# Patient Record
Sex: FEMALE | Race: WHITE | HISPANIC OR LATINO | Employment: UNEMPLOYED | URBAN - METROPOLITAN AREA
[De-identification: names, ages, dates, MRNs, and addresses within clinical notes are randomized per-mention and may not be internally consistent; named-entity substitution may affect disease eponyms.]

---

## 2017-01-14 ENCOUNTER — HOSPITAL ENCOUNTER (EMERGENCY)
Facility: HOSPITAL | Age: 42
Discharge: HOME | End: 2017-01-14
Attending: EMERGENCY MEDICINE

## 2017-01-14 VITALS
BODY MASS INDEX: 24.1 KG/M2 | OXYGEN SATURATION: 100 % | DIASTOLIC BLOOD PRESSURE: 65 MMHG | TEMPERATURE: 99 F | HEIGHT: 63 IN | SYSTOLIC BLOOD PRESSURE: 136 MMHG | HEART RATE: 80 BPM | RESPIRATION RATE: 18 BRPM | WEIGHT: 136 LBS

## 2017-01-14 DIAGNOSIS — M54.42 LEFT-SIDED LOW BACK PAIN WITH LEFT-SIDED SCIATICA, UNSPECIFIED CHRONICITY: Primary | ICD-10-CM

## 2017-01-14 DIAGNOSIS — M54.32 LEFT SIDED SCIATICA: ICD-10-CM

## 2017-01-14 LAB
BILIRUB UR QL STRIP: NEGATIVE
CLARITY UR: CLEAR
COLOR UR: YELLOW
GLUCOSE UR QL STRIP: NEGATIVE
HGB UR QL STRIP: NEGATIVE
KETONES UR QL STRIP: NEGATIVE
LEUKOCYTE ESTERASE UR QL STRIP: NEGATIVE
NITRITE UR QL STRIP: NEGATIVE
PH UR STRIP: 6 [PH] (ref 5–8)
PROT UR QL STRIP: NEGATIVE
SP GR UR STRIP: 1.02 (ref 1–1.03)
URN SPEC COLLECT METH UR: NORMAL
UROBILINOGEN UR STRIP-ACNC: NEGATIVE EU/DL

## 2017-01-14 PROCEDURE — 99284 EMERGENCY DEPT VISIT MOD MDM: CPT | Mod: 25

## 2017-01-14 PROCEDURE — 63600175 PHARM REV CODE 636 W HCPCS: Performed by: EMERGENCY MEDICINE

## 2017-01-14 PROCEDURE — 96372 THER/PROPH/DIAG INJ SC/IM: CPT

## 2017-01-14 PROCEDURE — 81003 URINALYSIS AUTO W/O SCOPE: CPT

## 2017-01-14 RX ORDER — KETOROLAC TROMETHAMINE 30 MG/ML
30 INJECTION, SOLUTION INTRAMUSCULAR; INTRAVENOUS
Status: COMPLETED | OUTPATIENT
Start: 2017-01-14 | End: 2017-01-14

## 2017-01-14 RX ORDER — DICLOFENAC POTASSIUM 50 MG/1
50 TABLET, FILM COATED ORAL 3 TIMES DAILY
Qty: 21 TABLET | Refills: 0 | Status: SHIPPED | OUTPATIENT
Start: 2017-01-14 | End: 2020-10-01

## 2017-01-14 RX ORDER — CYCLOBENZAPRINE HCL 10 MG
10 TABLET ORAL 3 TIMES DAILY PRN
Qty: 15 TABLET | Refills: 0 | Status: SHIPPED | OUTPATIENT
Start: 2017-01-14 | End: 2017-01-19

## 2017-01-14 RX ORDER — IBUPROFEN 800 MG/1
800 TABLET ORAL 3 TIMES DAILY
COMMUNITY
End: 2017-01-14 | Stop reason: ALTCHOICE

## 2017-01-14 RX ORDER — ALBUTEROL SULFATE 0.63 MG/3ML
0.63 SOLUTION RESPIRATORY (INHALATION) EVERY 6 HOURS PRN
COMMUNITY
End: 2020-10-01

## 2017-01-14 RX ADMIN — KETOROLAC TROMETHAMINE 30 MG: 30 INJECTION, SOLUTION INTRAMUSCULAR at 06:01

## 2017-01-14 NOTE — ED AVS SNAPSHOT
[facility]  [address]  [place] [address]               Tennille Fortune   2017  5:56 PM   ED    Description:  Female : 1975   Department:  [facility]           Your Care was Coordinated By:     Provider Role From To    Rigo Boyd Jr., MD Attending Provider [phone] --      Reason for Visit     Back Pain           Diagnoses this Visit        Comments    Left-sided low back pain with left-sided sciatica, unspecified chronicity    -  Primary     Left sided sciatica           ED Disposition     None           To Do List           Follow-up Information     Follow up with Joy Matias MD.    Specialty:  Family Medicine    Why:  This next week if not improved    Contact information:    [address]  [address]  [place] [address]  [phone]         These Medications        Disp Refills Start End    diclofenac (CATAFLAM) 50 MG tablet 21 tablet 0 2017     Take 1 tablet (50 mg total) by mouth 3 (three) times daily. Take with meals t.i.d. - Oral    Pharmacy: [facility] [address] [place] [address] [address] [facility] AT [facility] Ph #: [phone]       cyclobenzaprine (FLEXERIL) 10 MG tablet 15 tablet 0 2017    Take 1 tablet (10 mg total) by mouth 3 (three) times daily as needed for Muscle spasms. - Oral    [facility] [address] [place] [address] [address] [facility] Ph #: [phone]         [facility]     [facility] -  Assistance  Registered nurses in the [facility] provide clinical advisement, health education, appointment booking, and other advisory services.  Call for this free service at [phone].             Medications           Message regarding Medications     Verify the changes and/or additions to your medication regime listed below are the same as discussed with your clinician  "today.  If any of these changes or additions are incorrect, please notify your healthcare provider.        START taking these NEW medications        Refills    diclofenac (CATAFLAM) 50 MG tablet 0    Sig: Take 1 tablet (50 mg total) by mouth 3 (three) times daily. Take with meals t.i.d.    Class: Print    Route: Oral    cyclobenzaprine (FLEXERIL) 10 MG tablet 0    Sig: Take 1 tablet (10 mg total) by mouth 3 (three) times daily as needed for Muscle spasms.    Class: Print    Route: Oral      These medications were administered today        Dose Freq    ketorolac injection 30 mg 30 mg ED 1 Time    Sig: Inject 30 mg into the muscle ED 1 Time.    Class: Normal    Route: Intramuscular      STOP taking these medications     omeprazole (PRILOSEC) 40 MG capsule Take 1 capsule (40 mg total) by mouth once daily.    cetirizine (ZYRTEC) 10 MG tablet Take 1 tablet (10 mg total) by mouth once daily. For sinusitis and drainage.    fluoxetine (PROZAC) 10 MG capsule Take 1 capsule (10 mg total) by mouth once daily. For depression    ibuprofen (ADVIL,MOTRIN) 800 MG tablet Take 800 mg by mouth 3 (three) times daily.           Verify that the below list of medications is an accurate representation of the medications you are currently taking.  If none reported, the list may be blank. If incorrect, please contact your healthcare provider. Carry this list with you in case of emergency.           Current Medications     albuterol (ACCUNEB) 0.63 mg/3 mL Nebu Take 0.63 mg by nebulization every 6 (six) hours as needed.    cyclobenzaprine (FLEXERIL) 10 MG tablet Take 1 tablet (10 mg total) by mouth 3 (three) times daily as needed for Muscle spasms.    diclofenac (CATAFLAM) 50 MG tablet Take 1 tablet (50 mg total) by mouth 3 (three) times daily. Take with meals t.i.d.           Clinical Reference Information           Your Vitals Were     BP Pulse Temp Resp Height Weight    117/73 97 98.6 °F (37 °C) (Oral) 18 5' 3" (1.6 m) 61.7 kg (136 lb)    " SpO2 BMI             99% 24.09 kg/m2         Allergies as of 1/14/2017     No Known Allergies      Immunizations Administered on Date of Encounter - 1/14/2017     None      ED Micro, Lab, POCT     Start Ordered       Status Ordering Provider    01/14/17 1747 01/14/17 1746  Urinalysis Clean Catch  STAT      Final result       ED Imaging Orders     Start Ordered       Status Ordering Provider    01/14/17 1849 01/14/17 1849  X-Ray Lumbar Spine Ap And Lateral  1 time imaging      Final result     01/14/17 1849 01/14/17 1849  X-Ray Sacrum And Coccyx  1 time imaging      Final result         Discharge Instructions         Back Pain (Acute or Chronic)    Back pain is one of the most common problems. The good news is that most people feel better in 1 to 2 weeks, and most of the rest in 1 to 2 months. Most people can remain active.  People experience and describe pain differently; not everyone is the same.  · The pain can be sharp, stabbing, shooting, aching, cramping or burning.  · Movement, standing, bending, lifting, sitting, or walking may worsen pain.  · It can be localized to one spot or area, or it can be more generalized.  · It can spread or radiate upwards, to the front, or go down your arms or legs (sciatica).  · It can cause muscle spasm.  Most of the time, mechanical problems with the muscles or spine cause the pain. Mechanical problems are usually caused by an injury to the muscles or ligaments. While illness can cause back pain, it is usually not caused by a serious illness. Mechanical problems include:   · Physical activity such as sports, exercise, work, or normal activity  · Overexertion, lifting, pushing, pulling incorrectly or too aggressively  · Sudden twisting, bending, or stretching from an accident, or accidental movement  · Poor posture  · Stretching or moving wrong, without noticing pain at the time  · Poor coordination, lack of regular exercise (check with your doctor about this)  · Spinal disc  disease or arthritis  · Stress  Pain can also be related to pregnancy, or illness like appendicitis, bladder or kidney infections, pelvic infections, and many other things.  Acute back pain usually gets better in 1 to 2 weeks. Back pain related to disk disease, arthritis in the spinal joints or spinal stenosis (narrowing of the spinal canal) can become chronic and last for months or years.  Unless you had a physical injury (for example, a car accident or fall) X-rays are usually not needed for the initial evaluation of back pain. If pain continues and does not respond to medical treatment, X-rays and other tests may be needed.  Home care  Try these home care recommendations:  · When in bed, try to find a position of comfort. A firm mattress is best. Try lying flat on your back with pillows under your knees. You can also try lying on your side with your knees bent up towards your chest and a pillow between your knees.  · At first, do not try to stretch out the sore spots. If there is a strain, it is not like the good soreness you get after exercising without an injury. In this case, stretching may make it worse.  · Avoid prolong sitting, long car rides, or travel. This puts more stress on the lower back than standing or walking.  · During the first 24 to 72 hours after an acute injury or flare up of chronic back pain, apply an ice pack to the painful area for 20 minutes and then remove it for 20 minutes. Do this over a period of 60 to 90 minutes or several times a day. This will reduce swelling and pain. Wrap the ice pack in a thin towel or plastic to protect your skin.  · You can start with ice, then switch to heat. Heat (hot shower, hot bath, or heating pad) reduces pain and works well for muscle spasms. Heat can be applied to the painful area for 20 minutes then remove it for 20 minutes. Do this over a period of 60 to 90 minutes or several times a day. Do not sleep on a heating pad. It can lead to skin burns or  tissue damage.  · You can alternate ice and heat therapy. Talk with your doctor about the best treatment for your back pain.  · Therapeutic massage can help relax the back muscles without stretching them.  · Be aware of safe lifting methods and do not lift anything without stretching first.  Medicines  Talk to your doctor before using medicine, especially if you have other medical problems or are taking other medicines.  · You may use over-the-counter medicine as directed on the bottle to control pain, unless another pain medicine was prescribed. If you have chronic conditions like diabetes, liver or kidney disease, stomach ulcers, or gastrointestinal bleeding, or are taking blood thinners, talk to your doctor before taking any medicine.  · Be careful if you are given a prescription medicines, narcotics, or medicine for muscle spasms. They can cause drowsiness, affect your coordination, reflexes, and judgement. Do not drive or operate heavy machinery.  Follow-up care  Follow up with your healthcare provider, or as advised.   A radiologist will review any X-rays that were taken. Your provide will notify you of any new findings that may affect your care.  Call 911  Call emergency services if any of the following occur:  · Trouble breathing  · Confusion  · Very drowsy or trouble awakening  · Fainting or loss of consciousness  · Rapid or very slow heart rate  · Loss of bowel or bladder control  When to seek medical advice  Call your healthcare provider right away if any of these occur:   · Pain becomes worse or spreads to your legs  · Weakness or numbness in one or both legs  · Numbness in the groin or genital area  © 4667-4093 [employer]. [address] [place] [address], [place] [address]. All rights reserved. This information is not intended as a substitute for professional medical care. Always follow your healthcare professional's instructions.          Sciatica    Sciatica is a condition that causes pain in  the lower back and down into the buttock, hip, and leg. Sometimes the leg pain can happen without any back pain. Sciatica happens when a spinal nerve is irritated or has pressure put on it as comes out of the spinal canal in the lower back. This most often happens when a bulge or rupture of a nearby spinal disk presses on the nerve. Sciatica can also be caused by a narrowing of the spinal canal (spinal stenosis) or spasm of the muscle in the buttocks that the sciatic nerve passes through (pyriform muscle). Sciatica is also called lumbar radiculopathy.  Sciatica may begin after a sudden twisting or bending force, such as in a car accident. Or it can happen after a simple awkward movement. In either case, muscle spasm often also happens. Muscle spasm makes the pain worse.  A health care provider makes a diagnosis of sciatica from your symptoms and a physical exam. Unless you had an injury from a car accident or fall, you usually wont have X-rays taken at this time. This is because the nerves and disks in your back cant be seen on an X-ray. If the provider sees signs of a compressed nerve, you will need to schedule an MRI scan as an outpatient. Signs of a compressed nerve include loss of strength in a leg.  Most sciatica gets better with medicine, exercise, and physical therapy. If your symptoms continue after at least 3 months of medical treatment, you may need surgery.  Home care  Follow these tips when caring for yourself at home:  · You may need to stay in bed the first few days. But as soon as possible, begin sitting or walking. This will help you avoid problems that come from staying in bed for long periods.  · When in bed, try to find a position that is comfortable. A firm mattress is best. Try lying flat on your back with pillows under your knees. You can also try lying on your side with your knees bent up toward your chest and a pillow between your knees.  · Avoid sitting for long periods. This puts more  stress on your lower back than standing or walking.  · Use heat from a hot shower, hot bath, or heating pad to help ease pain. Massage can also help. You can also try using an ice pack. You can make your own ice pack by putting ice cubes in a plastic bag. Wrap the bag in a thin towel. Try both heat and cold to see which works best. Use the method that feels best for 20 minutes several times a day.  · You may use acetaminophen or ibuprofen to ease pain, unless another pain medicine was prescribed. Note: If you have chronic liver or kidney disease, talk with your health care provider before taking these medicines. Also talk with your provider if youve had a stomach ulcer or GI bleeding.  · Use safe lifting methods. Dont lift anything heavier than 15 pounds until all of the pain is gone.  Follow-up care  Follow up with your health care provider if your symptoms dont start to get better after 1 week. You may need physical therapy or additional tests.  If X-rays were taken, they will be looked at by a radiologist. You will be told of any new findings that may affect your care.  When to seek medical advice  Call your health care provider right away if any of these occur:  · Pain gets worse even after taking prescribed medicine  · Weakness or numbness in 1 or both legs or hips  · Numbness in your groin or genital area  · You cant control your bowel or bladder  · Fever  · Redness or swelling over your back or spine   © 1479-2462 [employer]. [address] [place] [address]. All rights reserved. This information is not intended as a substitute for professional medical care. Always follow your healthcare professional's instructions.          [facility] Sign-Up     Activating your [facility] account is as easy as 1-2-3!     1) Visit [email or web address], select Sign Up Now, enter this activation code and your date of birth, then select Next.  [email or web address]  Expires: 2/28/2017  7:47 PM      2) Create a  username and password to use when you visit [facility] in the future and select a security question in case you lose your password and select Next.    3) Enter your e-mail address and click Sign Up!    Additional Information  If you have questions, please e-mail [email or web address] or call [phone] to talk to our [employer] staff. Remember, [facility] is NOT to be used for urgent needs. For medical emergencies, dial 911.          [facility] complies with applicable Federal civil rights laws and does not discriminate on the basis of race, color, national origin, age, disability, or sex.        Language Assistance Services     ATTENTION: Language assistance services are available, free of charge. Please call [phone].      ATENCIÓN: Si habla español, tiene a duenas disposición servicios gratuitos de asistencia lingüística. Llame al [phone].     CHÚ Ý: N?u b?n nói Ti?ng Vi?t, có các d?ch v? h? tr? ngôn ng? mi?n phí dành cho b?n. G?i s? [phone].

## 2017-01-15 NOTE — DISCHARGE INSTRUCTIONS
Back Pain (Acute or Chronic)    Back pain is one of the most common problems. The good news is that most people feel better in 1 to 2 weeks, and most of the rest in 1 to 2 months. Most people can remain active.  People experience and describe pain differently; not everyone is the same.  · The pain can be sharp, stabbing, shooting, aching, cramping or burning.  · Movement, standing, bending, lifting, sitting, or walking may worsen pain.  · It can be localized to one spot or area, or it can be more generalized.  · It can spread or radiate upwards, to the front, or go down your arms or legs (sciatica).  · It can cause muscle spasm.  Most of the time, mechanical problems with the muscles or spine cause the pain. Mechanical problems are usually caused by an injury to the muscles or ligaments. While illness can cause back pain, it is usually not caused by a serious illness. Mechanical problems include:   · Physical activity such as sports, exercise, work, or normal activity  · Overexertion, lifting, pushing, pulling incorrectly or too aggressively  · Sudden twisting, bending, or stretching from an accident, or accidental movement  · Poor posture  · Stretching or moving wrong, without noticing pain at the time  · Poor coordination, lack of regular exercise (check with your doctor about this)  · Spinal disc disease or arthritis  · Stress  Pain can also be related to pregnancy, or illness like appendicitis, bladder or kidney infections, pelvic infections, and many other things.  Acute back pain usually gets better in 1 to 2 weeks. Back pain related to disk disease, arthritis in the spinal joints or spinal stenosis (narrowing of the spinal canal) can become chronic and last for months or years.  Unless you had a physical injury (for example, a car accident or fall) X-rays are usually not needed for the initial evaluation of back pain. If pain continues and does not respond to medical treatment, X-rays and other tests may be  needed.  Home care  Try these home care recommendations:  · When in bed, try to find a position of comfort. A firm mattress is best. Try lying flat on your back with pillows under your knees. You can also try lying on your side with your knees bent up towards your chest and a pillow between your knees.  · At first, do not try to stretch out the sore spots. If there is a strain, it is not like the good soreness you get after exercising without an injury. In this case, stretching may make it worse.  · Avoid prolong sitting, long car rides, or travel. This puts more stress on the lower back than standing or walking.  · During the first 24 to 72 hours after an acute injury or flare up of chronic back pain, apply an ice pack to the painful area for 20 minutes and then remove it for 20 minutes. Do this over a period of 60 to 90 minutes or several times a day. This will reduce swelling and pain. Wrap the ice pack in a thin towel or plastic to protect your skin.  · You can start with ice, then switch to heat. Heat (hot shower, hot bath, or heating pad) reduces pain and works well for muscle spasms. Heat can be applied to the painful area for 20 minutes then remove it for 20 minutes. Do this over a period of 60 to 90 minutes or several times a day. Do not sleep on a heating pad. It can lead to skin burns or tissue damage.  · You can alternate ice and heat therapy. Talk with your doctor about the best treatment for your back pain.  · Therapeutic massage can help relax the back muscles without stretching them.  · Be aware of safe lifting methods and do not lift anything without stretching first.  Medicines  Talk to your doctor before using medicine, especially if you have other medical problems or are taking other medicines.  · You may use over-the-counter medicine as directed on the bottle to control pain, unless another pain medicine was prescribed. If you have chronic conditions like diabetes, liver or kidney disease,  stomach ulcers, or gastrointestinal bleeding, or are taking blood thinners, talk to your doctor before taking any medicine.  · Be careful if you are given a prescription medicines, narcotics, or medicine for muscle spasms. They can cause drowsiness, affect your coordination, reflexes, and judgement. Do not drive or operate heavy machinery.  Follow-up care  Follow up with your healthcare provider, or as advised.   A radiologist will review any X-rays that were taken. Your provide will notify you of any new findings that may affect your care.  Call 911  Call emergency services if any of the following occur:  · Trouble breathing  · Confusion  · Very drowsy or trouble awakening  · Fainting or loss of consciousness  · Rapid or very slow heart rate  · Loss of bowel or bladder control  When to seek medical advice  Call your healthcare provider right away if any of these occur:   · Pain becomes worse or spreads to your legs  · Weakness or numbness in one or both legs  · Numbness in the groin or genital area  © 4182-5148 [employer]. [address] [place] [address]. All rights reserved. This information is not intended as a substitute for professional medical care. Always follow your healthcare professional's instructions.          Sciatica    Sciatica is a condition that causes pain in the lower back and down into the buttock, hip, and leg. Sometimes the leg pain can happen without any back pain. Sciatica happens when a spinal nerve is irritated or has pressure put on it as comes out of the spinal canal in the lower back. This most often happens when a bulge or rupture of a nearby spinal disk presses on the nerve. Sciatica can also be caused by a narrowing of the spinal canal (spinal stenosis) or spasm of the muscle in the buttocks that the sciatic nerve passes through (pyriform muscle). Sciatica is also called lumbar radiculopathy.  Sciatica may begin after a sudden twisting or bending force, such  as in a car accident. Or it can happen after a simple awkward movement. In either case, muscle spasm often also happens. Muscle spasm makes the pain worse.  A health care provider makes a diagnosis of sciatica from your symptoms and a physical exam. Unless you had an injury from a car accident or fall, you usually wont have X-rays taken at this time. This is because the nerves and disks in your back cant be seen on an X-ray. If the provider sees signs of a compressed nerve, you will need to schedule an MRI scan as an outpatient. Signs of a compressed nerve include loss of strength in a leg.  Most sciatica gets better with medicine, exercise, and physical therapy. If your symptoms continue after at least 3 months of medical treatment, you may need surgery.  Home care  Follow these tips when caring for yourself at home:  · You may need to stay in bed the first few days. But as soon as possible, begin sitting or walking. This will help you avoid problems that come from staying in bed for long periods.  · When in bed, try to find a position that is comfortable. A firm mattress is best. Try lying flat on your back with pillows under your knees. You can also try lying on your side with your knees bent up toward your chest and a pillow between your knees.  · Avoid sitting for long periods. This puts more stress on your lower back than standing or walking.  · Use heat from a hot shower, hot bath, or heating pad to help ease pain. Massage can also help. You can also try using an ice pack. You can make your own ice pack by putting ice cubes in a plastic bag. Wrap the bag in a thin towel. Try both heat and cold to see which works best. Use the method that feels best for 20 minutes several times a day.  · You may use acetaminophen or ibuprofen to ease pain, unless another pain medicine was prescribed. Note: If you have chronic liver or kidney disease, talk with your health care provider before taking these medicines. Also  talk with your provider if youve had a stomach ulcer or GI bleeding.  · Use safe lifting methods. Dont lift anything heavier than 15 pounds until all of the pain is gone.  Follow-up care  Follow up with your health care provider if your symptoms dont start to get better after 1 week. You may need physical therapy or additional tests.  If X-rays were taken, they will be looked at by a radiologist. You will be told of any new findings that may affect your care.  When to seek medical advice  Call your health care provider right away if any of these occur:  · Pain gets worse even after taking prescribed medicine  · Weakness or numbness in 1 or both legs or hips  · Numbness in your groin or genital area  · You cant control your bowel or bladder  · Fever  · Redness or swelling over your back or spine   © 9576-7358 [employer]. [address] [place] [address], [place] [address]. All rights reserved. This information is not intended as a substitute for professional medical care. Always follow your healthcare professional's instructions.

## 2017-01-15 NOTE — ED PROVIDER NOTES
Encounter Date: 2017       History     Chief Complaint   Patient presents with    Back Pain     c/o low back pain with radiation down ant lt leg denies trauma, pain for 1 week, denies dysuria. saw pcp given ibuprophen and sent for u/s but could not pay for it.     Review of patient's allergies indicates:  No Known Allergies  HPI Comments: Tennille Fortune, a 41 y.o. female, complains of lower back pain with left-sided sciatic radiation for the past 2 weeks.  She denies any injury.  She denies any dysuria.  Complains of intermittent constipation.  She was prescribed ibuprofen 800 mg 3 times a day but said it didn't seem to help so she hasn't taken it in several days.  Pain location: Left lower back pain with sciatica  Pain Severity: Moderate    Pain timin weeks  Pain character: Sharp  Associated with or Modified by: (see above)        Past Medical History   Diagnosis Date    GERD (gastroesophageal reflux disease)     History of abdominal pain     Urolithiasis      No past medical history pertinent negatives.  Past Surgical History   Procedure Laterality Date    Hysterectomy      Endometriosis       Family History   Problem Relation Age of Onset    Asthma Mother     Hypertension Father     Asthma Daughter     Asthma Son     Hypertension Maternal Aunt     Stroke Maternal Grandfather      Social History   Substance Use Topics    Smoking status: Never Smoker    Smokeless tobacco: Never Used    Alcohol use No     Review of Systems   Constitutional: Negative.    Genitourinary: Negative for dysuria and flank pain.   Musculoskeletal: Positive for back pain.   All other systems reviewed and are negative.      Physical Exam   Initial Vitals   BP Pulse Resp Temp SpO2   17 1747 17 1747 17 1747 17 1747 17 1747   117/73 97 18 98.6 °F (37 °C) 99 %     Physical Exam    Nursing note and vitals reviewed.  Constitutional: She appears well-developed and well-nourished. No distress.    Abdominal: Soft. There is no tenderness.   Musculoskeletal: Normal range of motion.   Slight decreased truncal range of motion with left lower back pain.  Mild paraspinous muscle tenderness in the lower left lumbar region.  Good straight leg raises bilaterally to greater than 60° without limitation or pain.  Full range of motion of all joints of both lower extremities without limitation.  No CVA tenderness.   Neurological: She is alert and oriented to person, place, and time. She has normal strength and normal reflexes.   Skin: Skin is warm and dry. No rash noted.   Psychiatric: She has a normal mood and affect. Her behavior is normal. Thought content normal.         ED Course   Procedures  Labs Reviewed   URINALYSIS             Medical Decision Making:   Initial Assessment:   41 y.o. female, complains of lower back pain with left-sided sciatic radiation for the past 2 weeks.  She denies any injury.  She denies any dysuria.  Complains of intermittent constipation.  She was prescribed ibuprofen 800 mg 3 times a day but said it didn't seem to help so she hasn't taken it in several days.  Pain location: Left lower back pain with sciatica  Pain Severity: Moderate    Physical exam: Left sided paraspinous lower lumbar muscle spasm.  Neurologic exam is normal.  ED Management:  Trace lumbar spine revealed the reveal mild degenerative changes but no acute changes.  Urinalysis was normal.  The patient will be switched from ibuprofen which she said did not help her to diclofenac and Flexeril.  She'll follow-up with her primary care physician if not improved.                   ED Course     Clinical Impression:   The primary encounter diagnosis was Left-sided low back pain with left-sided sciatica, unspecified chronicity. A diagnosis of Left sided sciatica was also pertinent to this visit.          Rigo Boyd Jr., MD  01/14/17 1950

## 2017-01-15 NOTE — ED NOTES
Patient has verified the spelling of their name and  on armband  LOC: The patient is awake, alert, and aware of environment with an appropriate affect, the patient is oriented x 3 and speaking appropriately.   APPEARANCE: Patient resting comfortably and in no acute distress, patient is clean and well groomed, patient's clothing is properly fastened.   SKIN: The skin is warm and dry, color consistent with ethnicity, patient has normal skin turgor and moist mucus membranes, skin intact, no breakdown or bruising noted.   :   Voids without difficulty  MUSCULOSKELETAL: Patient moving all extremities spontaneously, no obvious swelling or deformities noted.   RESPIRATORY: Airway is open and patent, respirations are spontaneous, patient has a normal effort and rate, no accessory muscle use noted, bilateral breath sounds clear, denies SOB   ABDOMEN: Soft and non tender to palpation, no distention noted, normoactive bowel sounds present in all four quadrants.   CARDIAC:  Normal rate and rhythm, no peripheral edema noted, less then 3 second capillary refill, denies chest pain  COMPLAINT:  Left lower back pain that radiates to left abdomen and left leg - rates pain 10 out of 10, denies fall or injury

## 2017-12-25 ENCOUNTER — HOSPITAL ENCOUNTER (EMERGENCY)
Facility: HOSPITAL | Age: 42
End: 2017-12-25
Attending: EMERGENCY MEDICINE

## 2017-12-25 VITALS
OXYGEN SATURATION: 99 % | RESPIRATION RATE: 18 BRPM | HEART RATE: 91 BPM | TEMPERATURE: 98 F | DIASTOLIC BLOOD PRESSURE: 76 MMHG | SYSTOLIC BLOOD PRESSURE: 108 MMHG

## 2017-12-25 DIAGNOSIS — F06.1 CATATONIA: Primary | ICD-10-CM

## 2017-12-25 PROBLEM — F39 MOOD DISORDER: Status: ACTIVE | Noted: 2017-12-25

## 2017-12-25 LAB
ALBUMIN SERPL BCP-MCNC: 4.2 G/DL
ALP SERPL-CCNC: 79 U/L
ALT SERPL W/O P-5'-P-CCNC: 26 U/L
AMPHET+METHAMPHET UR QL: NEGATIVE
ANION GAP SERPL CALC-SCNC: 12 MMOL/L
APAP SERPL-MCNC: <10 UG/ML
AST SERPL-CCNC: 17 U/L
BARBITURATES UR QL SCN>200 NG/ML: NEGATIVE
BASOPHILS # BLD AUTO: NORMAL K/UL
BASOPHILS NFR BLD: 0 %
BENZODIAZ UR QL SCN>200 NG/ML: NEGATIVE
BILIRUB SERPL-MCNC: 0.5 MG/DL
BILIRUB UR QL STRIP: NEGATIVE
BUN SERPL-MCNC: 14 MG/DL
BZE UR QL SCN: NEGATIVE
CALCIUM SERPL-MCNC: 9.1 MG/DL
CANNABINOIDS UR QL SCN: NEGATIVE
CHLORIDE SERPL-SCNC: 106 MMOL/L
CLARITY UR REFRACT.AUTO: CLEAR
CO2 SERPL-SCNC: 27 MMOL/L
COLOR UR AUTO: YELLOW
CREAT SERPL-MCNC: 0.75 MG/DL
CREAT UR-MCNC: 85.4 MG/DL
DIFFERENTIAL METHOD: NORMAL
EOSINOPHIL # BLD AUTO: NORMAL K/UL
EOSINOPHIL NFR BLD: 0 %
ERYTHROCYTE [DISTWIDTH] IN BLOOD BY AUTOMATED COUNT: 13.2 %
EST. GFR  (AFRICAN AMERICAN): >60 ML/MIN/1.73 M^2
EST. GFR  (NON AFRICAN AMERICAN): >60 ML/MIN/1.73 M^2
ETHANOL SERPL-MCNC: <10 MG/DL
GLUCOSE SERPL-MCNC: 101 MG/DL
GLUCOSE UR QL STRIP: NEGATIVE
HCT VFR BLD AUTO: 42.5 %
HGB BLD-MCNC: 14.4 G/DL
HGB UR QL STRIP: NEGATIVE
KETONES UR QL STRIP: NEGATIVE
LEUKOCYTE ESTERASE UR QL STRIP: NEGATIVE
LYMPHOCYTES # BLD AUTO: NORMAL K/UL
LYMPHOCYTES NFR BLD: 28 %
MCH RBC QN AUTO: 27.7 PG
MCHC RBC AUTO-ENTMCNC: 33.9 G/DL
MCV RBC AUTO: 82 FL
METHADONE UR QL SCN>300 NG/ML: NEGATIVE
MONOCYTES # BLD AUTO: NORMAL K/UL
MONOCYTES NFR BLD: 13 %
NEUTROPHILS NFR BLD: 58 %
NEUTS BAND NFR BLD MANUAL: 1 %
NITRITE UR QL STRIP: NEGATIVE
OPIATES UR QL SCN: NORMAL
PCP UR QL SCN>25 NG/ML: NEGATIVE
PH UR STRIP: 7 [PH] (ref 5–8)
PLATELET # BLD AUTO: 232 K/UL
PLATELET BLD QL SMEAR: NORMAL
PMV BLD AUTO: 10.8 FL
POTASSIUM SERPL-SCNC: 3.7 MMOL/L
PROT SERPL-MCNC: 7.6 G/DL
PROT UR QL STRIP: NEGATIVE
RBC # BLD AUTO: 5.2 M/UL
SALICYLATES SERPL-MCNC: <5 MG/DL
SODIUM SERPL-SCNC: 145 MMOL/L
SP GR UR STRIP: 1.01 (ref 1–1.03)
TOXICOLOGY INFORMATION: NORMAL
URN SPEC COLLECT METH UR: NORMAL
UROBILINOGEN UR STRIP-ACNC: NEGATIVE EU/DL
WBC # BLD AUTO: 7.24 K/UL

## 2017-12-25 PROCEDURE — 85027 COMPLETE CBC AUTOMATED: CPT

## 2017-12-25 PROCEDURE — 81003 URINALYSIS AUTO W/O SCOPE: CPT

## 2017-12-25 PROCEDURE — 99285 EMERGENCY DEPT VISIT HI MDM: CPT

## 2017-12-25 PROCEDURE — 85007 BL SMEAR W/DIFF WBC COUNT: CPT

## 2017-12-25 PROCEDURE — 99282 EMERGENCY DEPT VISIT SF MDM: CPT | Mod: GT,,, | Performed by: PSYCHIATRY & NEUROLOGY

## 2017-12-25 PROCEDURE — 80307 DRUG TEST PRSMV CHEM ANLYZR: CPT

## 2017-12-25 PROCEDURE — 80320 DRUG SCREEN QUANTALCOHOLS: CPT

## 2017-12-25 PROCEDURE — 80053 COMPREHEN METABOLIC PANEL: CPT

## 2017-12-25 PROCEDURE — 80329 ANALGESICS NON-OPIOID 1 OR 2: CPT
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
## 2017-12-25 NOTE — ED NOTES
"Pt called nurse into room to say that she "don't need placement in psychiatric hospital, just help with my sinuses.". Informed pt that she refused to speak to MD in the ER or with the MD on tele psych, and there is a PEC in place now, that she will go to a psychiatric facility. She states again that she does not think she needs to go  "

## 2017-12-25 NOTE — ED NOTES
Admit packet faxed to Counts include 234 beds at the [facility], [facility], [facility], [facility], [facility], [facility], [facility], [place], [facility], [facility], , [facility], [facility], [facility], [facility], [facility], [facility], [facility], [facility], [facility], [facility], [facility], [facility], [facility], [facility], [facility], [facility], [place], [facility], [facility], [facility], [facility] and [facility]. Waiting for response.

## 2017-12-25 NOTE — ED NOTES
[facility] here for pt. Pt family hasalready taken her clothing, per ER tech/sitter. Bag of pt meds sent with [facility] staff.
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
## 2017-12-25 NOTE — ED NOTES
Pt has be accepted to [facility] via [facility]. Accepting MD is Dr. Tulio Gonzales. Call report number is [phone]. Call report in 15 min per facility request.

## 2017-12-25 NOTE — ED NOTES
Dr Rebollar at bedside. Speaking with patient's family. Pt refusing to verbalize but will nod head occasionally to answer questions.

## 2017-12-25 NOTE — ED NOTES
Attempted to get pt med list from [facility] and [facility] and both are closed for [facility]. Pt  Son came to visit. Pt speaking calmly in room with him .

## 2017-12-25 NOTE — ED NOTES
Pt here via ambulance from residence. Pt sent by family because pt wouldn't get out of bed today, wouldn't eat and wouldn't speak to anyone in the house. Pt would occasionally nod to answer questions.  Pt arrived awake and alert. Tearful. Pt lying in fetal position. Pt non verbal but would nod to some questions.  Respirations non labored.

## 2017-12-25 NOTE — ED NOTES
Went in with md to speak with pt. She was very forthcoming about her complaints, tearful, stating that she has the flu and just does not feel good
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
## 2017-12-25 NOTE — ED NOTES
"Pt's family at bedside. Pt started to talk some to family. Attempted to get patient to answer questions but pt kept repeating "I want to go home, I want to go home".  "

## 2017-12-26 PROBLEM — M19.90 ARTHRITIS: Status: ACTIVE | Noted: 2017-12-26

## 2017-12-26 PROBLEM — J45.20 MILD INTERMITTENT ASTHMA WITHOUT COMPLICATION: Status: ACTIVE | Noted: 2017-12-26

## 2018-05-23 DIAGNOSIS — R51.9 HEADACHE: Primary | ICD-10-CM

## 2019-06-15 ENCOUNTER — HOSPITAL ENCOUNTER (EMERGENCY)
Facility: HOSPITAL | Age: 44
Discharge: HOME | End: 2019-06-15
Attending: EMERGENCY MEDICINE
Payer: MEDICAID

## 2019-06-15 VITALS
HEART RATE: 79 BPM | OXYGEN SATURATION: 100 % | WEIGHT: 150 LBS | RESPIRATION RATE: 19 BRPM | HEIGHT: 63 IN | SYSTOLIC BLOOD PRESSURE: 126 MMHG | DIASTOLIC BLOOD PRESSURE: 76 MMHG | BODY MASS INDEX: 26.58 KG/M2 | TEMPERATURE: 99 F

## 2019-06-15 DIAGNOSIS — H57.11 EYE PAIN, RIGHT: Primary | ICD-10-CM

## 2019-06-15 DIAGNOSIS — T15.91XA FOREIGN BODY OF RIGHT EYE, INITIAL ENCOUNTER: ICD-10-CM

## 2019-06-15 PROCEDURE — 25000003 PHARM REV CODE 250: Mod: ER | Performed by: PHYSICIAN ASSISTANT

## 2019-06-15 PROCEDURE — 25000003 PHARM REV CODE 250: Mod: ER

## 2019-06-15 PROCEDURE — 99283 EMERGENCY DEPT VISIT LOW MDM: CPT | Mod: ER

## 2019-06-15 RX ORDER — PROPARACAINE HYDROCHLORIDE 5 MG/ML
1 SOLUTION/ DROPS OPHTHALMIC
Status: DISCONTINUED | OUTPATIENT
Start: 2019-06-15 | End: 2019-06-15

## 2019-06-15 RX ORDER — TETRACAINE HYDROCHLORIDE 5 MG/ML
2 SOLUTION OPHTHALMIC ONCE
Status: COMPLETED | OUTPATIENT
Start: 2019-06-15 | End: 2019-06-15

## 2019-06-15 RX ORDER — TETRACAINE HYDROCHLORIDE 5 MG/ML
SOLUTION OPHTHALMIC
Status: COMPLETED
Start: 2019-06-15 | End: 2019-06-15

## 2019-06-15 RX ORDER — ERYTHROMYCIN 5 MG/G
OINTMENT OPHTHALMIC EVERY 6 HOURS
Qty: 1 TUBE | Refills: 0 | Status: SHIPPED | OUTPATIENT
Start: 2019-06-15 | End: 2020-10-01

## 2019-06-15 RX ADMIN — FLUORESCEIN SODIUM 1 EACH: 1 STRIP OPHTHALMIC at 10:06

## 2019-06-15 RX ADMIN — TETRACAINE HYDROCHLORIDE 2 DROP: 5 SOLUTION OPHTHALMIC at 10:06

## 2019-06-16 NOTE — DISCHARGE INSTRUCTIONS
You are advised to follow up with  for re-evaluation within 3 days.  You are instructed to return to the emergency department immediately for any new or worsening symptoms.

## 2019-06-17 NOTE — ED PROVIDER NOTES
Encounter Date: 6/15/2019       History     Chief Complaint   Patient presents with    Eye Problem     Pt reoprts possible FB to OD X 1 day.  Scant clear drainage noted to OD.     44-year-old female presents to the emergency department for evaluation foreign body sensation to her right eye.  She reports that she was cleaning this afternoon when she feels as though some dust or dirt may a flu any to her right eye.  She reports that she has had mild irritation and clear drainage from the eye since that time.  She attempted to wash out the eye with water with no relief of symptoms.  She denies any vision changes, redness/swelling to the upper or lower eyelids bilaterally. She denies any other associated symptoms including fever, headache, dizziness, tinnitus, neck pain, chest pain or abdominal pain.        Review of patient's allergies indicates:   Allergen Reactions    Latex, natural rubber Itching     Past Medical History:   Diagnosis Date    GERD (gastroesophageal reflux disease)     History of abdominal pain     Urolithiasis      Past Surgical History:   Procedure Laterality Date    endometriosis      HYSTERECTOMY       Family History   Problem Relation Age of Onset    Asthma Mother     Hypertension Father     Asthma Daughter     Asthma Son     Hypertension Maternal Aunt     Stroke Maternal Grandfather      Social History     Tobacco Use    Smoking status: Never Smoker    Smokeless tobacco: Never Used   Substance Use Topics    Alcohol use: No    Drug use: No     Review of Systems   Constitutional: Negative for activity change, appetite change and fever.   HENT: Negative for congestion, ear pain, mouth sores, rhinorrhea, sinus pressure, sore throat, trouble swallowing and voice change.    Eyes: Positive for pain, discharge and redness. Negative for photophobia, itching and visual disturbance.   Respiratory: Negative for cough, chest tightness, shortness of breath and wheezing.    Cardiovascular:  Negative for chest pain.   Gastrointestinal: Negative for abdominal pain, constipation, diarrhea, nausea and vomiting.   Genitourinary: Negative for decreased urine volume and dysuria.   Musculoskeletal: Negative for back pain, joint swelling, neck pain and neck stiffness.   Neurological: Negative for dizziness, syncope, weakness, light-headedness, numbness and headaches.       Physical Exam     Initial Vitals [06/15/19 2242]   BP Pulse Resp Temp SpO2   126/76 79 19 98.5 °F (36.9 °C) 100 %      MAP       --         Physical Exam    Nursing note and vitals reviewed.  Constitutional: She appears well-developed and well-nourished. She is not diaphoretic. No distress.   HENT:   Head: Normocephalic and atraumatic.   Right Ear: External ear normal.   Left Ear: External ear normal.   Nose: Nose normal.   Mouth/Throat: Oropharynx is clear and moist.   Eyes: EOM are normal. Pupils are equal, round, and reactive to light. Right eye exhibits no chemosis, no discharge and no hordeolum. Foreign body present in the right eye. Left eye exhibits no chemosis and no discharge. No foreign body present in the left eye. Right conjunctiva is injected. Right conjunctiva has no hemorrhage. Left conjunctiva is not injected. Left conjunctiva has no hemorrhage.   Slit lamp exam:       The right eye shows no corneal abrasion, no corneal ulcer, no hyphema, no fluorescein uptake and no anterior chamber bulge.   Neck: Normal range of motion. Neck supple.   Cardiovascular: Normal rate, regular rhythm and normal heart sounds.   Pulmonary/Chest: Breath sounds normal. No respiratory distress. She has no wheezes. She has no rhonchi. She has no rales. She exhibits no tenderness.   Abdominal: Soft. Bowel sounds are normal. She exhibits no distension. There is no tenderness. There is no rebound.   Lymphadenopathy:     She has no cervical adenopathy.   Neurological: She is alert and oriented to person, place, and time.   Skin: Skin is warm and dry.    Psychiatric: She has a normal mood and affect.         ED Course   Procedures  Labs Reviewed - No data to display       Imaging Results    None          Medical Decision Making:   Initial Assessment:   44-year-old female presents for evaluation of mild right eye irritation and foreign body sensation.  Physical exam reveals a nontoxic-appearing female in no acute distress. Patient is afebrile vital signs within normal limits.  Neurological exam reveals an alert and oriented patient.  No periorbital erythema, edema or ecchymosis noted.  Lids everted and small amount of debris noted under the eyelid which was easily removed using a cotton tip swab.  Fluorescein Wood's lamp exam reveals no evidence of imbedded foreign body, corneal ulceration or exudate. No hyphema or Georgina sign noted. Neck is supple, no meningeal signs noted. Lungs clear to auscultation bilaterally.  Differential Diagnosis:   Foreign body eye  I carefully considered but doubt serious etiology including imbedded foreign body, open globe or glaucoma.  ED Management:  Discussed these findings at length patient verbalizes understanding and agreement course of treatment.  Instructed the patient to follow up with  for re-evaluation within 3 days.  Instructed the patient to return to the emergency department immediately for any new or worsening symptoms.                      Clinical Impression:       ICD-10-CM ICD-9-CM   1. Eye pain, right H57.11 379.91   2. Foreign body of right eye, initial encounter T15.91XA 930.9     E914                                Inocencia Gómez PA-C  06/17/19 0004

## 2019-11-20 DIAGNOSIS — M54.50 LOW BACK PAIN: Primary | ICD-10-CM

## 2019-11-26 ENCOUNTER — CLINICAL SUPPORT (OUTPATIENT)
Dept: REHABILITATION | Facility: HOSPITAL | Age: 44
End: 2019-11-26
Payer: MEDICAID

## 2019-11-26 DIAGNOSIS — M53.3 SI (SACROILIAC) PAIN: ICD-10-CM

## 2019-11-26 DIAGNOSIS — R29.898 WEAKNESS OF LEFT LOWER EXTREMITY: ICD-10-CM

## 2019-11-26 DIAGNOSIS — R29.898 HIP TIGHTNESS: ICD-10-CM

## 2019-11-26 DIAGNOSIS — M62.9 HAMSTRING TIGHTNESS OF BOTH LOWER EXTREMITIES: ICD-10-CM

## 2019-11-26 PROCEDURE — 97162 PT EVAL MOD COMPLEX 30 MIN: CPT | Mod: PO
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
## 2019-11-26 NOTE — PATIENT INSTRUCTIONS
I joint Muscle energy for R Posterior/L anterior rotation    Bring both knees up towards your chest as shown in the picture.  Place your Right hand on top of your right thigh, and your left hand on the back of your Left thigh.  Push your legs into each hand with about 50% effort.  Hold for 3 seconds. Repeat 3 times        Adductor Squeeze MET    Lying on your back with knees bent, place a ball between your knees.  Squeeze your knees together with moderate force and hold for a 3 count. Repeat 3 times.    Supine: Leg Stretch With Strap (Intermediate)        Lie on back with one knee bent, foot flat on floor. Hook strap around other foot. Straighten knee. Raise leg further toward its maximal range. Hold 10 seconds. Relax leg completely down to floor. Repeat with the opposite leg.   Repeat 10 times per session. Do 2 sessions per day.    Copyright © [employer]. All rights reserved.   Piriformis Stretch, Supine        Lie supine, legs bent, feet flat. Raise one bent leg and, grasping ankle with both hands, pull leg toward opposite shoulder. Hold 10 seconds.   Repeat 10 times per session. Do 2 sessions per day.   Copyright © [employer]. All rights reserved.

## 2019-11-27 PROBLEM — M62.9 HAMSTRING TIGHTNESS OF BOTH LOWER EXTREMITIES: Status: ACTIVE | Noted: 2019-11-27

## 2019-11-27 PROBLEM — R29.898 HIP TIGHTNESS: Status: ACTIVE | Noted: 2019-11-27

## 2019-11-27 PROBLEM — M53.3 SI (SACROILIAC) PAIN: Status: ACTIVE | Noted: 2019-11-27

## 2019-11-27 PROBLEM — R29.898 WEAKNESS OF LEFT LOWER EXTREMITY: Status: ACTIVE | Noted: 2019-11-27

## 2019-12-11 ENCOUNTER — CLINICAL SUPPORT (OUTPATIENT)
Dept: REHABILITATION | Facility: HOSPITAL | Age: 44
End: 2019-12-11
Payer: MEDICAID

## 2019-12-11 DIAGNOSIS — M62.9 HAMSTRING TIGHTNESS OF BOTH LOWER EXTREMITIES: ICD-10-CM

## 2019-12-11 DIAGNOSIS — R29.898 WEAKNESS OF LEFT LOWER EXTREMITY: ICD-10-CM

## 2019-12-11 DIAGNOSIS — R29.898 HIP TIGHTNESS: ICD-10-CM

## 2019-12-11 DIAGNOSIS — M53.3 SI (SACROILIAC) PAIN: ICD-10-CM

## 2019-12-11 PROCEDURE — 97110 THERAPEUTIC EXERCISES: CPT | Mod: PO

## 2019-12-11 NOTE — PATIENT INSTRUCTIONS
Isometric Stabilization        Tighten abdominal muscles as if tightening a belt. Hold 5 seconds.  Do 10 times, 2 times per day.     [email or web address]://ss.[email or web address].[email or web address]     Copyright © [employer]. All rights reserved.   Bent Leg Lift (Hook-Lying)        Tighten stomach and slowly raise right leg from floor. Keep trunk rigid. Repeat with the left leg.   Repeat 10 times per set. Do 3 sets per session. Do 2 sessions per day.     [email or web address]://[email or web address]     Copyright © [employer]. All rights reserved.   Bridging        Slowly raise buttocks from floor, keeping stomach tight.  Repeat 10 times per set. Do 3 sets per session. Do 2 sessions per day.     [email or web address]     Copyright © [employer]. All rights reserved.   Straight Leg Raise        Tighten stomach and slowly raise locked right leg from floor. Repeat with the left leg.   Repeat 10 times per set. Do 3 sets per session. Do 2 sessions per day.     [email or web address]     Copyright © [employer]. All rights reserved.

## 2019-12-11 NOTE — PROGRESS NOTES
"  Physical Therapy Daily Treatment Note     Name: Tennille Fortune  Clinic Number: [id number]    Therapy Diagnosis:   Encounter Diagnoses   Name Primary?    Weakness of left lower extremity     SI (sacroiliac) pain     Hamstring tightness of both lower extremities     Hip tightness      Physician: Casey Tran MD    Visit Date: 12/11/2019    Physician Orders: PT Eval and Treat   Medical Diagnosis from Referral: M54.5 (ICD-10-CM) - Low back pain  Evaluation Date: 11/26/2019  Authorization Period Expiration: 11/19/2020  Plan of Care Expiration: 01/26/2020  Visit # / Visits authorized: 1/ 16     Time In: 10:00 am  Time Out: 10:45 am  Total Billable Time: 45 minutes    Precautions: Standard    Subjective     Pt reports: doing way better than last time. She did walk on the treadmill yesterday and had some pain towards the end of her treadmill walk of 35 minutes.  She was compliant with home exercise program.  Response to previous treatment: decreased pain  Functional change: able to resume her usual activities    Pain: 1/10  Location: left back  and L anterior thigh      Objective     Tennille received therapeutic exercises to develop strength, flexibility and core stabilization for 45 minutes including:    Tennille presented to the clinic with level pelvis prior to the start of the session.     Date 2/11/19   Visit 2/17 2/21/20   FTF 12/26/19   FOTO 2/5   POC exp 01/26/20       MT    HS stretch 10 x 10"   Piriformis str 10 x 10"   Supine:    TAs 10 x 5"   March 1 x 10   Bug --   Bridge 1 x 10   SLR 1 x 10   HIp abd 1 x 10   Hip add 1 x 10   Prone:    Alt LE Next?   Alt LE/UE --   Seated:    March Next?   LAQs Next?   Initials DG       Home Exercises Provided and Patient Education Provided     Education provided:   - continue with HEP to her tolerance.    Written Home Exercises Provided: yes.  Exercises were reviewed and Tennille was able to demonstrate them prior to the end of the session.  Tennille demonstrated good  " understanding of the education provided.     See EMR under Patient Instructions for exercises provided 12/11/2019.    Assessment     Tennille was able to begin making progress towards her goals as she was able to perform all of today's activities with no increase in symptoms prior to leaving the clinic. She required occasional verbal cues to maintain her core engagement with all exercises.   Tennille is progressing well towards her goals.   Pt prognosis is Good.     Pt will continue to benefit from skilled outpatient physical therapy to address the deficits listed in the problem list box on initial evaluation, provide pt/family education and to maximize pt's level of independence in the home and community environment.     Pt's spiritual, cultural and educational needs considered and pt agreeable to plan of care and goals.     Anticipated barriers to physical therapy: none    Goals:   Short Term Goals: 4 weeks   1. This patient will be independent with a basic HEP. In progress  2. This patient will increase B SLR 20 degrees in order to be able to ambulate longer than 15 minutes with no increase in symptoms. In progress  3. This patient will increase L LE strength by 1 grade in order to perform sit to stand with no increase in symptoms. In progress  4. This patient will have a pain rating of 5/10 at worst with ADLs. In progress  5. Patient able to score greater than or equal to 54 on the FOTO Lumbar Spine Survey placing patient in 40%<60% impaired, limited, or restricted category demonstrating overall decreased low back pain with functional activities. In progress   Long Term Goals: 8 weeks   1. This patient will be independent with an updated HEP. In progress  2. This patient will increase L LE strength to 5/5 in order to perform her usual household duties with no increase in symptoms. In progress  3. This patient will have a pain rating of 2/10 at worst with ADLs. In progress  4. Patient able to score greater than or equal  to 64 on the FOTO Lumbar Spine Survey placing patient in 40%<60% impaired, limited, or restricted category demonstrating overall decreased low back pain with functional activities.  In progress     Plan     Continue with physical therapy plan of care next visit. Increase reps with all strengthening exercises next visit and begin seated stabilization exercises next visit.    Tina Roberts, PT

## 2019-12-19 ENCOUNTER — TELEPHONE (OUTPATIENT)
Dept: REHABILITATION | Facility: HOSPITAL | Age: 44
End: 2019-12-19

## 2019-12-19 NOTE — TELEPHONE ENCOUNTER
Called regarding today's No Show, spoke with patient who stated that she called the  to let her know that she could not make it in today as she was sick.  I was not informed of this.  Informed patient that her next appointment is on 12/26 @ 10:00.  Patient verbally acknowledged.

## 2019-12-26 ENCOUNTER — TELEPHONE (OUTPATIENT)
Dept: REHABILITATION | Facility: HOSPITAL | Age: 44
End: 2019-12-26

## 2019-12-27 NOTE — TELEPHONE ENCOUNTER
Called regarding today's No Show, left a message on her voicemail reminding her that her next appointment is scheduled for 01/02/20 @ 10:00.

## 2020-01-02 ENCOUNTER — DOCUMENTATION ONLY (OUTPATIENT)
Dept: REHABILITATION | Facility: HOSPITAL | Age: 45
End: 2020-01-02

## 2020-01-02 DIAGNOSIS — M53.3 SI (SACROILIAC) PAIN: ICD-10-CM

## 2020-01-02 DIAGNOSIS — M62.9 HAMSTRING TIGHTNESS OF BOTH LOWER EXTREMITIES: ICD-10-CM

## 2020-01-02 DIAGNOSIS — R29.898 HIP TIGHTNESS: ICD-10-CM

## 2020-01-02 DIAGNOSIS — R29.898 WEAKNESS OF LEFT LOWER EXTREMITY: ICD-10-CM

## 2020-01-02 NOTE — PROGRESS NOTES
Face to Face PTA Conference performed with Paxton Fierro, PTA regarding patient's current status, overall progress, and plan of care    Face to Face PTA Conference performed with Tina Roberts, PT regarding patient's current status, overall progress, and plan of care    Paxton Fierro  1/7/2020

## 2020-10-01 ENCOUNTER — HOSPITAL ENCOUNTER (EMERGENCY)
Facility: HOSPITAL | Age: 45
Discharge: HOME | End: 2020-10-02
Attending: EMERGENCY MEDICINE
Payer: MEDICAID

## 2020-10-01 DIAGNOSIS — K29.70 GASTRITIS, PRESENCE OF BLEEDING UNSPECIFIED, UNSPECIFIED CHRONICITY, UNSPECIFIED GASTRITIS TYPE: Primary | ICD-10-CM

## 2020-10-01 DIAGNOSIS — R10.9 ABDOMINAL PAIN: ICD-10-CM

## 2020-10-01 LAB
AMPHET+METHAMPHET UR QL: NEGATIVE
BARBITURATES UR QL SCN>200 NG/ML: NEGATIVE
BENZODIAZ UR QL SCN>200 NG/ML: NEGATIVE
BILIRUB UR QL STRIP: NEGATIVE
BZE UR QL SCN: NEGATIVE
CANNABINOIDS UR QL SCN: NEGATIVE
CLARITY UR REFRACT.AUTO: CLEAR
COLOR UR AUTO: NORMAL
CREAT UR-MCNC: 41.8 MG/DL (ref 15–325)
GLUCOSE UR QL STRIP: NEGATIVE
HGB UR QL STRIP: NEGATIVE
KETONES UR QL STRIP: NEGATIVE
LEUKOCYTE ESTERASE UR QL STRIP: NEGATIVE
METHADONE UR QL SCN>300 NG/ML: NEGATIVE
NITRITE UR QL STRIP: NEGATIVE
OPIATES UR QL SCN: NEGATIVE
PCP UR QL SCN>25 NG/ML: NEGATIVE
PH UR STRIP: 8 [PH] (ref 5–8)
PROT UR QL STRIP: NEGATIVE
SP GR UR STRIP: 1.01 (ref 1–1.03)
TOXICOLOGY INFORMATION: NORMAL
URN SPEC COLLECT METH UR: NORMAL
UROBILINOGEN UR STRIP-ACNC: NEGATIVE EU/DL

## 2020-10-01 PROCEDURE — 99284 EMERGENCY DEPT VISIT MOD MDM: CPT | Mod: 25,ER

## 2020-10-01 PROCEDURE — 25000003 PHARM REV CODE 250: Mod: ER | Performed by: EMERGENCY MEDICINE

## 2020-10-01 PROCEDURE — 81003 URINALYSIS AUTO W/O SCOPE: CPT | Mod: 59,ER

## 2020-10-01 PROCEDURE — 96361 HYDRATE IV INFUSION ADD-ON: CPT | Mod: ER

## 2020-10-01 PROCEDURE — 80307 DRUG TEST PRSMV CHEM ANLYZR: CPT | Mod: ER

## 2020-10-01 PROCEDURE — 96374 THER/PROPH/DIAG INJ IV PUSH: CPT | Mod: ER

## 2020-10-01 RX ORDER — MAG HYDROX/ALUMINUM HYD/SIMETH 200-200-20
30 SUSPENSION, ORAL (FINAL DOSE FORM) ORAL
Status: COMPLETED | OUTPATIENT
Start: 2020-10-01 | End: 2020-10-01

## 2020-10-01 RX ORDER — PANTOPRAZOLE SODIUM 40 MG/1
40 TABLET, DELAYED RELEASE ORAL
Status: COMPLETED | OUTPATIENT
Start: 2020-10-01 | End: 2020-10-01

## 2020-10-01 RX ORDER — ONDANSETRON 4 MG/1
8 TABLET, ORALLY DISINTEGRATING ORAL
Status: COMPLETED | OUTPATIENT
Start: 2020-10-01 | End: 2020-10-01

## 2020-10-01 RX ORDER — FAMOTIDINE 20 MG/1
20 TABLET, FILM COATED ORAL
Status: COMPLETED | OUTPATIENT
Start: 2020-10-01 | End: 2020-10-01

## 2020-10-01 RX ORDER — LIDOCAINE HYDROCHLORIDE 20 MG/ML
10 SOLUTION OROPHARYNGEAL
Status: COMPLETED | OUTPATIENT
Start: 2020-10-01 | End: 2020-10-01

## 2020-10-01 RX ADMIN — SODIUM CHLORIDE 2000 ML: 0.9 INJECTION, SOLUTION INTRAVENOUS at 11:10

## 2020-10-01 RX ADMIN — LIDOCAINE HYDROCHLORIDE 10 ML: 20 SOLUTION ORAL; TOPICAL at 11:10

## 2020-10-01 RX ADMIN — PANTOPRAZOLE SODIUM 40 MG: 40 TABLET, DELAYED RELEASE ORAL at 11:10

## 2020-10-01 RX ADMIN — ONDANSETRON 8 MG: 4 TABLET, ORALLY DISINTEGRATING ORAL at 10:10

## 2020-10-01 RX ADMIN — FAMOTIDINE 20 MG: 20 TABLET ORAL at 11:10

## 2020-10-01 RX ADMIN — ALUMINUM HYDROXIDE, MAGNESIUM HYDROXIDE, AND SIMETHICONE 30 ML: 200; 200; 20 SUSPENSION ORAL at 11:10

## 2020-10-02 VITALS
HEART RATE: 83 BPM | BODY MASS INDEX: 24.8 KG/M2 | HEIGHT: 63 IN | WEIGHT: 140 LBS | DIASTOLIC BLOOD PRESSURE: 87 MMHG | RESPIRATION RATE: 14 BRPM | TEMPERATURE: 98 F | SYSTOLIC BLOOD PRESSURE: 139 MMHG | OXYGEN SATURATION: 100 %

## 2020-10-02 LAB
ALBUMIN SERPL BCP-MCNC: 4.2 G/DL (ref 3.5–5.2)
ALP SERPL-CCNC: 69 U/L (ref 38–126)
ALT SERPL W/O P-5'-P-CCNC: 13 U/L (ref 10–44)
ANION GAP SERPL CALC-SCNC: 7 MMOL/L (ref 8–16)
AST SERPL-CCNC: 22 U/L (ref 15–46)
BASOPHILS # BLD AUTO: 0.06 K/UL (ref 0–0.2)
BASOPHILS NFR BLD: 0.8 % (ref 0–1.9)
BILIRUB SERPL-MCNC: 0.5 MG/DL (ref 0.1–1)
BUN SERPL-MCNC: 14 MG/DL (ref 7–17)
CALCIUM SERPL-MCNC: 9.3 MG/DL (ref 8.7–10.5)
CHLORIDE SERPL-SCNC: 105 MMOL/L (ref 95–110)
CO2 SERPL-SCNC: 29 MMOL/L (ref 23–29)
CREAT SERPL-MCNC: 0.82 MG/DL (ref 0.5–1.4)
DIFFERENTIAL METHOD: ABNORMAL
EOSINOPHIL # BLD AUTO: 0.1 K/UL (ref 0–0.5)
EOSINOPHIL NFR BLD: 1.6 % (ref 0–8)
ERYTHROCYTE [DISTWIDTH] IN BLOOD BY AUTOMATED COUNT: 12.3 % (ref 11.5–14.5)
EST. GFR  (AFRICAN AMERICAN): >60 ML/MIN/1.73 M^2
EST. GFR  (NON AFRICAN AMERICAN): >60 ML/MIN/1.73 M^2
GLUCOSE SERPL-MCNC: 89 MG/DL (ref 70–110)
HCT VFR BLD AUTO: 36.8 % (ref 37–48.5)
HGB BLD-MCNC: 12.6 G/DL (ref 12–16)
IMM GRANULOCYTES # BLD AUTO: 0.02 K/UL (ref 0–0.04)
IMM GRANULOCYTES NFR BLD AUTO: 0.3 % (ref 0–0.5)
LIPASE SERPL-CCNC: 187 U/L (ref 23–300)
LIPASE SERPL-CCNC: 187 U/L (ref 23–300)
LYMPHOCYTES # BLD AUTO: 1.9 K/UL (ref 1–4.8)
LYMPHOCYTES NFR BLD: 24.5 % (ref 18–48)
MCH RBC QN AUTO: 28 PG (ref 27–31)
MCHC RBC AUTO-ENTMCNC: 34.2 G/DL (ref 32–36)
MCV RBC AUTO: 82 FL (ref 82–98)
MONOCYTES # BLD AUTO: 0.8 K/UL (ref 0.3–1)
MONOCYTES NFR BLD: 9.7 % (ref 4–15)
NEUTROPHILS # BLD AUTO: 4.9 K/UL (ref 1.8–7.7)
NEUTROPHILS NFR BLD: 63.1 % (ref 38–73)
NRBC BLD-RTO: 0 /100 WBC
PLATELET # BLD AUTO: 233 K/UL (ref 150–350)
PMV BLD AUTO: 11.4 FL (ref 9.2–12.9)
POTASSIUM SERPL-SCNC: 3.8 MMOL/L (ref 3.5–5.1)
PROT SERPL-MCNC: 7.3 G/DL (ref 6–8.4)
RBC # BLD AUTO: 4.5 M/UL (ref 4–5.4)
SODIUM SERPL-SCNC: 141 MMOL/L (ref 136–145)
WBC # BLD AUTO: 7.74 K/UL (ref 3.9–12.7)

## 2020-10-02 PROCEDURE — 25000003 PHARM REV CODE 250: Mod: ER | Performed by: EMERGENCY MEDICINE

## 2020-10-02 PROCEDURE — 80053 COMPREHEN METABOLIC PANEL: CPT | Mod: ER

## 2020-10-02 PROCEDURE — 85025 COMPLETE CBC W/AUTO DIFF WBC: CPT | Mod: ER

## 2020-10-02 PROCEDURE — 63600175 PHARM REV CODE 636 W HCPCS: Mod: ER | Performed by: EMERGENCY MEDICINE

## 2020-10-02 PROCEDURE — 83690 ASSAY OF LIPASE: CPT | Mod: ER

## 2020-10-02 RX ORDER — METOCLOPRAMIDE HYDROCHLORIDE 5 MG/ML
10 INJECTION INTRAMUSCULAR; INTRAVENOUS
Status: COMPLETED | OUTPATIENT
Start: 2020-10-02 | End: 2020-10-02

## 2020-10-02 RX ORDER — FAMOTIDINE 20 MG/1
20 TABLET, FILM COATED ORAL 2 TIMES DAILY
Qty: 20 TABLET | Refills: 0 | Status: SHIPPED | OUTPATIENT
Start: 2020-10-02 | End: 2021-10-02

## 2020-10-02 RX ORDER — ONDANSETRON 4 MG/1
4 TABLET, FILM COATED ORAL EVERY 6 HOURS PRN
Qty: 5 TABLET | Refills: 0 | Status: SHIPPED | OUTPATIENT
Start: 2020-10-02

## 2020-10-02 RX ORDER — PANTOPRAZOLE SODIUM 20 MG/1
40 TABLET, DELAYED RELEASE ORAL DAILY
Qty: 60 TABLET | Refills: 0 | Status: SHIPPED | OUTPATIENT
Start: 2020-10-02 | End: 2020-11-01

## 2020-10-02 RX ADMIN — METOCLOPRAMIDE 10 MG: 5 INJECTION, SOLUTION INTRAMUSCULAR; INTRAVENOUS at 01:10

## 2020-10-02 NOTE — ED NOTES
Pt states that she is unable to provide a urine specimen due to using the restroom PTA. States that she will attempt at a later time.

## 2020-10-02 NOTE — ED PROVIDER NOTES
Chief Complaint  Chief Complaint   Patient presents with    Vomiting     emesis and generalized abodminal pain that began on monday. 2 episodes of emesis reported today. Denies fever/diarrhea    Abdominal Pain       HPI  Tennille Fortune is a 45 y.o. female who presents with vomiting and midepigastric abdominal pain.  Patient reports that this started today.  She does have a GI doctor and is seen GI doctors for similar pains.  Although she has never understood any diagnosis or understanding of why she gets these recurrent gastric pains.  She was scheduled for colonoscopy but never did get that.  She denies any GI bleeding.  No trauma or suspicious food.  No diarrhea today.  No vaginal complaints or urinary complaints today.  No relieving factors but her pains and nausea or exacerbated by eating and drinking    Past medical history  Past Medical History:   Diagnosis Date    GERD (gastroesophageal reflux disease)     History of abdominal pain     Urolithiasis        Current Medications  No current facility-administered medications for this encounter.     Current Outpatient Medications:     famotidine (PEPCID) 20 MG tablet, Take 1 tablet (20 mg total) by mouth 2 (two) times daily., Disp: 20 tablet, Rfl: 0    ondansetron (ZOFRAN) 4 MG tablet, Take 1 tablet (4 mg total) by mouth every 6 (six) hours as needed for Nausea., Disp: 5 tablet, Rfl: 0    pantoprazole (PROTONIX) 20 MG tablet, Take 2 tablets (40 mg total) by mouth once daily., Disp: 60 tablet, Rfl: 0    Allergies  Review of patient's allergies indicates:   Allergen Reactions    Latex, natural rubber Itching       Surgical history  Past Surgical History:   Procedure Laterality Date    endometriosis      HYSTERECTOMY         Social history  Social History     Socioeconomic History    Marital status: Single     Spouse name: Not on file    Number of children: Not on file    Years of education: Not on file    Highest education level: Not on file  "  Occupational History    Not on file   Social Needs    Financial resource strain: Not on file    Food insecurity     Worry: Not on file     Inability: Not on file    Transportation needs     Medical: Not on file     Non-medical: Not on file   Tobacco Use    Smoking status: Never Smoker    Smokeless tobacco: Never Used   Substance and Sexual Activity    Alcohol use: No    Drug use: No    Sexual activity: Not Currently     Partners: Male   Lifestyle    Physical activity     Days per week: Not on file     Minutes per session: Not on file    Stress: Not on file   Relationships    Social connections     Talks on phone: Not on file     Gets together: Not on file     Attends Protestant service: Not on file     Active member of club or organization: Not on file     Attends meetings of clubs or organizations: Not on file     Relationship status: Not on file   Other Topics Concern    Not on file   Social History Narrative    Not on file       Family History  Family History   Problem Relation Age of Onset    Asthma Mother     Hypertension Father     Asthma Daughter     Asthma Son     Hypertension Maternal Aunt     Stroke Maternal Grandfather        Review of systems  : No dysuria or discharge.  Musculoskeletal: No injury; full range of motion.  Skin: No rash, abscess, or laceration.  Neurologic: No new focal weakness or sensory changes.  All systems otherwise negative except as noted in ROS and HPI    Physical Exam  Vital signs: /87 (BP Location: Left arm, Patient Position: Sitting)   Pulse 83   Temp 98.4 °F (36.9 °C)   Resp 14   Ht 5' 3" (1.6 m)   Wt 63.5 kg (140 lb)   LMP  (Exact Date)   SpO2 100%   BMI 24.80 kg/m²   Constitutional: No acute distress.  Well developed, alert, oriented and appropriate.  HENT: Normocephalic, atraumatic. Normal ear, nose, and throat.  Eyes: PERRL, EOMI, normal conjunctiva.  Neck: Normal range of motion, no tenderness; supple.  Respiratory: Nonlabored breathing " with normal breath sounds.  Cardiovascular: RRR with no pulse deficit.  GI: Soft, nontender, no rebound or guarding.  Musculoskeletal: Normal ROM, no tenderness, injury, or edema.  Skin: Warm, dry skin without infection or injury.  Neurologic: Normal motor, sensation with no new focal deficit.  Psychiatric: Affect normal, judgement normal, mood normal.  No SI, HI, and not gravely disabled.    Labs  Pertinent labs reviewed (see chart for details)  Labs Reviewed   CBC W/ AUTO DIFFERENTIAL - Abnormal; Notable for the following components:       Result Value    Hematocrit 36.8 (*)     All other components within normal limits   COMPREHENSIVE METABOLIC PANEL - Abnormal; Notable for the following components:    Anion Gap 7 (*)     All other components within normal limits   URINALYSIS, REFLEX TO URINE CULTURE    Narrative:     Specimen Source->Urine   DRUG SCREEN PANEL, URINE EMERGENCY    Narrative:     Specimen Source->Urine   LIPASE   LIPASE       ECG  No results found for this or any previous visit.  ECG interpreted by ED MD    Radiology  X-Ray Abdomen Flat And Erect   Final Result      No evidence for bowel obstruction.  Moderate amount of stool in the colon.  Correlate to element of constipation.         Electronically signed by: Surendra Velez   Date:    10/01/2020   Time:    23:47          Procedures  Procedures    Medications   ondansetron disintegrating tablet 8 mg (8 mg Oral Given 10/1/20 2248)   famotidine tablet 20 mg (20 mg Oral Given 10/1/20 2313)   pantoprazole EC tablet 40 mg (40 mg Oral Given 10/1/20 2312)   aluminum-magnesium hydroxide-simethicone 200-200-20 mg/5 mL suspension 30 mL (30 mLs Oral Given 10/1/20 2312)   lidocaine HCl 2% oral solution 10 mL (10 mLs Oral Given 10/1/20 2312)   sodium chloride 0.9% bolus 2,000 mL (0 mLs Intravenous Stopped 10/2/20 0124)   metoclopramide HCl injection 10 mg (10 mg Intravenous Given 10/2/20 0104)       ED course    ED Course as of Oct 02 0209   Fri Oct 02, 2020    0047 Patient is tolerating p.o. and smiling.  She reports she still has some discomfort.    [MB]   0142 Patient resting comfortably and feels improved.  Reports no pain.  Smiling and would like to go home.  Politely declines any further testing or observation this including CT    [MB]      ED Course User Index  [MB] Dave De Anda MD         Medical Decision Making:    History:  Old medical records:  I decided to obtain old medical records.  Old records summarized:  No ER visits this year    Initial assessment:  45-year-old female with nausea and vomiting and midepigastric pain    Differential diagnosis:  Appendicitis, diverticulitis, cholecystitis, urinary tract infection, pyelonephritis, ovarian cyst, ovarian abscess, ovarian torsion, vaginal infection, retained tampon or foreign body, gastritis or GERD, traumatic injury or internal bleeding, colitis, gastroenteritis, or pancreatitis    Clinical tests:   Labs:  Ordered and reviewed  Imaging:  Ordered and reviewed    ED management:  Patient feels significant improvement but still had some nausea.  She tolerated p.o. easily in comfortably and was smiling.  She will follow up outpatient and politely declines any further testing or imaging such as CT scan or further observation here in the ER      Disposition    Patient discharged in stable condition      Final impression  1. Gastritis, presence of bleeding unspecified, unspecified chronicity, unspecified gastritis type    2. Abdominal pain        Critical care time spent with this patient was 0 minutes excluding the procedure time.          Dave De Anda MD  10/02/20 5065

## 2020-10-02 NOTE — ED NOTES
Warm blanket provided for pt comfort; unable to PO challenge pt at this time s/t continued nausea.

## 2021-03-08 ENCOUNTER — LAB VISIT (OUTPATIENT)
Dept: LAB | Facility: HOSPITAL | Age: 46
End: 2021-03-08
Attending: FAMILY MEDICINE
Payer: MEDICAID

## 2021-03-08 DIAGNOSIS — R42 DIZZINESS AND GIDDINESS: ICD-10-CM

## 2021-03-08 DIAGNOSIS — R53.83 FATIGUE: ICD-10-CM

## 2021-03-08 DIAGNOSIS — J01.90 ACUTE SINUSITIS, UNSPECIFIED: Primary | ICD-10-CM

## 2021-03-08 LAB
ALBUMIN SERPL BCP-MCNC: 4.1 G/DL (ref 3.5–5.2)
ALP SERPL-CCNC: 66 U/L (ref 55–135)
ALT SERPL W/O P-5'-P-CCNC: 12 U/L (ref 10–44)
ANION GAP SERPL CALC-SCNC: 9 MMOL/L (ref 8–16)
AST SERPL-CCNC: 12 U/L (ref 10–40)
BASOPHILS # BLD AUTO: 0.06 K/UL (ref 0–0.2)
BASOPHILS NFR BLD: 0.7 % (ref 0–1.9)
BILIRUB SERPL-MCNC: 0.6 MG/DL (ref 0.1–1)
BUN SERPL-MCNC: 15 MG/DL (ref 6–20)
CALCIUM SERPL-MCNC: 9.1 MG/DL (ref 8.7–10.5)
CHLORIDE SERPL-SCNC: 107 MMOL/L (ref 95–110)
CHOLEST SERPL-MCNC: 196 MG/DL (ref 120–199)
CHOLEST/HDLC SERPL: 2.9 {RATIO} (ref 2–5)
CO2 SERPL-SCNC: 24 MMOL/L (ref 23–29)
CREAT SERPL-MCNC: 0.8 MG/DL (ref 0.5–1.4)
DIFFERENTIAL METHOD: ABNORMAL
EOSINOPHIL # BLD AUTO: 0 K/UL (ref 0–0.5)
EOSINOPHIL NFR BLD: 0.5 % (ref 0–8)
ERYTHROCYTE [DISTWIDTH] IN BLOOD BY AUTOMATED COUNT: 12.9 % (ref 11.5–14.5)
EST. GFR  (AFRICAN AMERICAN): >60 ML/MIN/1.73 M^2
EST. GFR  (NON AFRICAN AMERICAN): >60 ML/MIN/1.73 M^2
ESTIMATED AVG GLUCOSE: 103 MG/DL (ref 68–131)
GLUCOSE SERPL-MCNC: 88 MG/DL (ref 70–110)
HBA1C MFR BLD: 5.2 % (ref 4–5.6)
HCT VFR BLD AUTO: 38.4 % (ref 37–48.5)
HDLC SERPL-MCNC: 67 MG/DL (ref 40–75)
HDLC SERPL: 34.2 % (ref 20–50)
HGB BLD-MCNC: 13.2 G/DL (ref 12–16)
IMM GRANULOCYTES # BLD AUTO: 0.03 K/UL (ref 0–0.04)
IMM GRANULOCYTES NFR BLD AUTO: 0.3 % (ref 0–0.5)
LDLC SERPL CALC-MCNC: 117 MG/DL (ref 63–159)
LYMPHOCYTES # BLD AUTO: 2.7 K/UL (ref 1–4.8)
LYMPHOCYTES NFR BLD: 30.8 % (ref 18–48)
MCH RBC QN AUTO: 27.9 PG (ref 27–31)
MCHC RBC AUTO-ENTMCNC: 34.4 G/DL (ref 32–36)
MCV RBC AUTO: 81 FL (ref 82–98)
MONOCYTES # BLD AUTO: 0.7 K/UL (ref 0.3–1)
MONOCYTES NFR BLD: 8.4 % (ref 4–15)
NEUTROPHILS # BLD AUTO: 5.1 K/UL (ref 1.8–7.7)
NEUTROPHILS NFR BLD: 59.3 % (ref 38–73)
NONHDLC SERPL-MCNC: 129 MG/DL
NRBC BLD-RTO: 0 /100 WBC
PLATELET # BLD AUTO: 275 K/UL (ref 150–350)
PMV BLD AUTO: 11.7 FL (ref 9.2–12.9)
POTASSIUM SERPL-SCNC: 3.4 MMOL/L (ref 3.5–5.1)
PROT SERPL-MCNC: 7.4 G/DL (ref 6–8.4)
RBC # BLD AUTO: 4.73 M/UL (ref 4–5.4)
SODIUM SERPL-SCNC: 140 MMOL/L (ref 136–145)
TRIGL SERPL-MCNC: 60 MG/DL (ref 30–150)
TSH SERPL DL<=0.005 MIU/L-ACNC: 1.49 UIU/ML (ref 0.4–4)
WBC # BLD AUTO: 8.65 K/UL (ref 3.9–12.7)

## 2021-03-08 PROCEDURE — 80053 COMPREHEN METABOLIC PANEL: CPT | Performed by: FAMILY MEDICINE

## 2021-03-08 PROCEDURE — 36415 COLL VENOUS BLD VENIPUNCTURE: CPT | Performed by: FAMILY MEDICINE

## 2021-03-08 PROCEDURE — 84443 ASSAY THYROID STIM HORMONE: CPT | Performed by: FAMILY MEDICINE

## 2021-03-08 PROCEDURE — 82746 ASSAY OF FOLIC ACID SERUM: CPT | Performed by: FAMILY MEDICINE

## 2021-03-08 PROCEDURE — 80061 LIPID PANEL: CPT | Performed by: FAMILY MEDICINE

## 2021-03-08 PROCEDURE — 83036 HEMOGLOBIN GLYCOSYLATED A1C: CPT | Performed by: FAMILY MEDICINE

## 2021-03-08 PROCEDURE — 85025 COMPLETE CBC W/AUTO DIFF WBC: CPT | Performed by: FAMILY MEDICINE

## 2021-03-08 PROCEDURE — 82607 VITAMIN B-12: CPT | Performed by: FAMILY MEDICINE

## 2021-03-09 LAB
FOLATE SERPL-MCNC: 9.7 NG/ML (ref 4–24)
VIT B12 SERPL-MCNC: 699 PG/ML (ref 210–950)

## 2021-07-23 ENCOUNTER — HOSPITAL ENCOUNTER (EMERGENCY)
Facility: HOSPITAL | Age: 46
Discharge: HOME | End: 2021-07-23
Attending: EMERGENCY MEDICINE
Payer: MEDICAID

## 2021-07-23 VITALS
OXYGEN SATURATION: 96 % | BODY MASS INDEX: 25.69 KG/M2 | SYSTOLIC BLOOD PRESSURE: 110 MMHG | HEIGHT: 63 IN | RESPIRATION RATE: 17 BRPM | WEIGHT: 145 LBS | TEMPERATURE: 98 F | HEART RATE: 87 BPM | DIASTOLIC BLOOD PRESSURE: 73 MMHG

## 2021-07-23 DIAGNOSIS — U07.1 COVID-19: Primary | ICD-10-CM

## 2021-07-23 LAB
ALBUMIN SERPL BCP-MCNC: 4.6 G/DL (ref 3.5–5.2)
ALP SERPL-CCNC: 79 U/L (ref 38–126)
ALT SERPL W/O P-5'-P-CCNC: 13 U/L (ref 10–44)
ANION GAP SERPL CALC-SCNC: 10 MMOL/L (ref 8–16)
AST SERPL-CCNC: 27 U/L (ref 15–46)
BASOPHILS # BLD AUTO: 0 K/UL (ref 0–0.2)
BASOPHILS NFR BLD: 0 % (ref 0–1.9)
BILIRUB SERPL-MCNC: 0.6 MG/DL (ref 0.1–1)
CALCIUM SERPL-MCNC: 9.2 MG/DL (ref 8.7–10.5)
CHLORIDE SERPL-SCNC: 100 MMOL/L (ref 95–110)
CO2 SERPL-SCNC: 27 MMOL/L (ref 23–29)
CREAT SERPL-MCNC: 0.88 MG/DL (ref 0.5–1.4)
DIFFERENTIAL METHOD: ABNORMAL
EOSINOPHIL # BLD AUTO: 0 K/UL (ref 0–0.5)
EOSINOPHIL NFR BLD: 0 % (ref 0–8)
ERYTHROCYTE [DISTWIDTH] IN BLOOD BY AUTOMATED COUNT: 12.7 % (ref 11.5–14.5)
EST. GFR  (AFRICAN AMERICAN): >60 ML/MIN/1.73 M^2
EST. GFR  (NON AFRICAN AMERICAN): >60 ML/MIN/1.73 M^2
GLUCOSE SERPL-MCNC: 110 MG/DL (ref 70–110)
HCT VFR BLD AUTO: 40.5 % (ref 37–48.5)
HGB BLD-MCNC: 13.7 G/DL (ref 12–16)
IMM GRANULOCYTES # BLD AUTO: 0.01 K/UL (ref 0–0.04)
IMM GRANULOCYTES NFR BLD AUTO: 0.3 % (ref 0–0.5)
LIPASE SERPL-CCNC: 161 U/L (ref 23–300)
LYMPHOCYTES # BLD AUTO: 0.6 K/UL (ref 1–4.8)
LYMPHOCYTES NFR BLD: 19.3 % (ref 18–48)
MCH RBC QN AUTO: 27.6 PG (ref 27–31)
MCHC RBC AUTO-ENTMCNC: 33.8 G/DL (ref 32–36)
MCV RBC AUTO: 82 FL (ref 82–98)
MONOCYTES # BLD AUTO: 0.4 K/UL (ref 0.3–1)
MONOCYTES NFR BLD: 13.7 % (ref 4–15)
NEUTROPHILS # BLD AUTO: 2 K/UL (ref 1.8–7.7)
NEUTROPHILS NFR BLD: 66.7 % (ref 38–73)
NRBC BLD-RTO: 0 /100 WBC
PLATELET # BLD AUTO: 176 K/UL (ref 150–450)
PMV BLD AUTO: 11.5 FL (ref 9.2–12.9)
POTASSIUM SERPL-SCNC: 4.1 MMOL/L (ref 3.5–5.1)
PROT SERPL-MCNC: 7.8 G/DL (ref 6–8.4)
RBC # BLD AUTO: 4.96 M/UL (ref 4–5.4)
SODIUM SERPL-SCNC: 137 MMOL/L (ref 136–145)
UUN UR-MCNC: 9 MG/DL (ref 7–17)
WBC # BLD AUTO: 3.06 K/UL (ref 3.9–12.7)

## 2021-07-23 PROCEDURE — 80053 COMPREHEN METABOLIC PANEL: CPT | Mod: ER | Performed by: EMERGENCY MEDICINE

## 2021-07-23 PROCEDURE — 96360 HYDRATION IV INFUSION INIT: CPT | Mod: ER

## 2021-07-23 PROCEDURE — 83690 ASSAY OF LIPASE: CPT | Mod: ER | Performed by: EMERGENCY MEDICINE

## 2021-07-23 PROCEDURE — 85025 COMPLETE CBC W/AUTO DIFF WBC: CPT | Mod: ER | Performed by: EMERGENCY MEDICINE

## 2021-07-23 PROCEDURE — 99284 EMERGENCY DEPT VISIT MOD MDM: CPT | Mod: 25,ER

## 2021-07-23 PROCEDURE — 25000003 PHARM REV CODE 250: Mod: ER | Performed by: EMERGENCY MEDICINE

## 2021-07-23 RX ORDER — ONDANSETRON 2 MG/ML
4 INJECTION INTRAMUSCULAR; INTRAVENOUS
Status: DISCONTINUED | OUTPATIENT
Start: 2021-07-23 | End: 2021-07-23 | Stop reason: HOSPADM

## 2021-07-23 RX ORDER — DICYCLOMINE HYDROCHLORIDE 20 MG/1
20 TABLET ORAL 2 TIMES DAILY
Qty: 20 TABLET | Refills: 0 | Status: SHIPPED | OUTPATIENT
Start: 2021-07-23 | End: 2021-08-22

## 2021-07-23 RX ORDER — ACETAMINOPHEN 500 MG
1000 TABLET ORAL
Status: COMPLETED | OUTPATIENT
Start: 2021-07-23 | End: 2021-07-23

## 2021-07-23 RX ORDER — SODIUM CHLORIDE 9 MG/ML
INJECTION, SOLUTION INTRAVENOUS
Status: COMPLETED | OUTPATIENT
Start: 2021-07-23 | End: 2021-07-23

## 2021-07-23 RX ADMIN — SODIUM CHLORIDE: 0.9 INJECTION, SOLUTION INTRAVENOUS at 12:07

## 2021-07-23 RX ADMIN — ACETAMINOPHEN 1000 MG: 500 TABLET ORAL at 12:07

## 2021-07-28 ENCOUNTER — NURSE TRIAGE (OUTPATIENT)
Dept: ADMINISTRATIVE | Facility: CLINIC | Age: 46
End: 2021-07-28

## 2021-12-14 ENCOUNTER — TELEPHONE (OUTPATIENT)
Dept: ADMINISTRATIVE | Facility: OTHER | Age: 46
End: 2021-12-14
Payer: MEDICAID

## 2022-01-18 ENCOUNTER — OFFICE VISIT (OUTPATIENT)
Dept: OBSTETRICS AND GYNECOLOGY | Facility: CLINIC | Age: 47
End: 2022-01-18
Payer: MEDICAID

## 2022-01-18 VITALS
DIASTOLIC BLOOD PRESSURE: 84 MMHG | SYSTOLIC BLOOD PRESSURE: 122 MMHG | WEIGHT: 147.25 LBS | BODY MASS INDEX: 26.09 KG/M2

## 2022-01-18 DIAGNOSIS — Z01.419 ROUTINE GYNECOLOGICAL EXAMINATION: ICD-10-CM

## 2022-01-18 DIAGNOSIS — Z11.3 SCREENING FOR STDS (SEXUALLY TRANSMITTED DISEASES): Primary | ICD-10-CM

## 2022-01-18 PROCEDURE — 1160F RVW MEDS BY RX/DR IN RCRD: CPT | Mod: CPTII,,, | Performed by: OBSTETRICS & GYNECOLOGY

## 2022-01-18 PROCEDURE — 1159F MED LIST DOCD IN RCRD: CPT | Mod: CPTII,,, | Performed by: OBSTETRICS & GYNECOLOGY

## 2022-01-18 PROCEDURE — 3079F PR MOST RECENT DIASTOLIC BLOOD PRESSURE 80-89 MM HG: ICD-10-PCS | Mod: CPTII,,, | Performed by: OBSTETRICS & GYNECOLOGY

## 2022-01-18 PROCEDURE — 3008F PR BODY MASS INDEX (BMI) DOCUMENTED: ICD-10-PCS | Mod: CPTII,,, | Performed by: OBSTETRICS & GYNECOLOGY

## 2022-01-18 PROCEDURE — 99386 PR PREVENTIVE VISIT,NEW,40-64: ICD-10-PCS | Mod: S$PBB,,, | Performed by: OBSTETRICS & GYNECOLOGY

## 2022-01-18 PROCEDURE — 87591 N.GONORRHOEAE DNA AMP PROB: CPT | Performed by: OBSTETRICS & GYNECOLOGY

## 2022-01-18 PROCEDURE — 3074F PR MOST RECENT SYSTOLIC BLOOD PRESSURE < 130 MM HG: ICD-10-PCS | Mod: CPTII,,, | Performed by: OBSTETRICS & GYNECOLOGY

## 2022-01-18 PROCEDURE — 1159F PR MEDICATION LIST DOCUMENTED IN MEDICAL RECORD: ICD-10-PCS | Mod: CPTII,,, | Performed by: OBSTETRICS & GYNECOLOGY

## 2022-01-18 PROCEDURE — 99386 PREV VISIT NEW AGE 40-64: CPT | Mod: S$PBB,,, | Performed by: OBSTETRICS & GYNECOLOGY

## 2022-01-18 PROCEDURE — 87491 CHLMYD TRACH DNA AMP PROBE: CPT | Performed by: OBSTETRICS & GYNECOLOGY

## 2022-01-18 PROCEDURE — 3074F SYST BP LT 130 MM HG: CPT | Mod: CPTII,,, | Performed by: OBSTETRICS & GYNECOLOGY

## 2022-01-18 PROCEDURE — 87801 DETECT AGNT MULT DNA AMPLI: CPT | Performed by: OBSTETRICS & GYNECOLOGY

## 2022-01-18 PROCEDURE — 99213 OFFICE O/P EST LOW 20 MIN: CPT | Mod: PBBFAC,PO | Performed by: OBSTETRICS & GYNECOLOGY

## 2022-01-18 PROCEDURE — 3079F DIAST BP 80-89 MM HG: CPT | Mod: CPTII,,, | Performed by: OBSTETRICS & GYNECOLOGY

## 2022-01-18 PROCEDURE — 87481 CANDIDA DNA AMP PROBE: CPT | Mod: 59 | Performed by: OBSTETRICS & GYNECOLOGY

## 2022-01-18 PROCEDURE — 99999 PR PBB SHADOW E&M-EST. PATIENT-LVL III: ICD-10-PCS | Mod: PBBFAC,,, | Performed by: OBSTETRICS & GYNECOLOGY

## 2022-01-18 PROCEDURE — 3008F BODY MASS INDEX DOCD: CPT | Mod: CPTII,,, | Performed by: OBSTETRICS & GYNECOLOGY

## 2022-01-18 PROCEDURE — 99999 PR PBB SHADOW E&M-EST. PATIENT-LVL III: CPT | Mod: PBBFAC,,, | Performed by: OBSTETRICS & GYNECOLOGY

## 2022-01-18 PROCEDURE — 1160F PR REVIEW ALL MEDS BY PRESCRIBER/CLIN PHARMACIST DOCUMENTED: ICD-10-PCS | Mod: CPTII,,, | Performed by: OBSTETRICS & GYNECOLOGY

## 2022-01-18 RX ORDER — CLOTRIMAZOLE AND BETAMETHASONE DIPROPIONATE 10; .64 MG/G; MG/G
CREAM TOPICAL 2 TIMES DAILY
Qty: 15 G | Refills: 1 | Status: SHIPPED | OUTPATIENT
Start: 2022-01-18

## 2022-01-19 NOTE — PROGRESS NOTES
GYNECOLOGY  :  Tennille Fortune is a 46 y.o.    Here today for  gyn annual visit     No gyn  Complaints  Hx Hysterectomy ( endometriosis? Pelvic pain)    No Hx Abnormal PAP smears  No Hx Abnormal Mammogram        Past Medical History:   Diagnosis Date    GERD (gastroesophageal reflux disease)     History of abdominal pain     Urolithiasis      Past Surgical History:   Procedure Laterality Date    endometriosis      HYSTERECTOMY       Family History   Problem Relation Age of Onset    Asthma Mother     Hypertension Father     Asthma Daughter     Asthma Son     Hypertension Maternal Aunt     Stroke Maternal Grandfather      Social History     Tobacco Use    Smoking status: Never Smoker    Smokeless tobacco: Never Used   Substance Use Topics    Alcohol use: No    Drug use: No     OB History    Para Term  AB Living   3 3       3   SAB IAB Ectopic Multiple Live Births           3      # Outcome Date GA Lbr Odilon/2nd Weight Sex Delivery Anes PTL Lv   3 Para      Vag-Spont   SHANTI   2 Para      Vag-Spont   SHANTI   1 Para      Vag-Spont   SHANTI       /84   Wt 66.8 kg (147 lb 4.3 oz)   LMP  (Exact Date)   BMI 26.09 kg/m²     Last Mammogram =   Last Colonoscopy  = -      COMPREHENSIVE GYN HISTORY:  G 3 P 3     PAP History: Denies abnormal Paps.  Infection History: Denies STDs. Denies PID.  Benign History: Denies uterine fibroids. Denies ovarian cysts. Denies endometriosis.   Cancer History: Denies cervical cancer. Denies uterine cancer or hyperplasia. Denies ovarian cancer. Denies vulvar cancer or pre-cancer. Denies vaginal cancer or pre-cancer. Denies breast cancer. Denies colon cancer.  Sexual Activity History: ( x ) Yes   ( - )   no       ROS  GENERAL: Denies significant weight gain or weight loss. Feeling well overall.  SKIN: Denies rash or lesions.  Normal skin turgor  HEAD: Denies head injury or headache.   NODES: Denies enlarged lymph nodes.   CHEST: Denies chest pain or shortness  of breath.   CARDIOVASCULAR: Denies palpitations or left sided chest pain.   ABDOMEN: No abdominal pain,no  diarrhea,constipation  nausea, vomiting or rectal bleeding.   URINARY: normal  Frequency,no  Dysuria or burning on urination.   REPRODUCTIVE: Per HPI   BREASTS: The patient sometimes performs breast self-examination and denies pain, lumps, or nipple discharge.   HEMATOLOGIC: No easy bruisability or excessive bleeding.   MUSCULOSKELETAL: Denies joint pain or swelling.   NEUROLOGIC: Denies syncope or weakness.   PSYCHIATRIC: Denies depression, anxiety or mood swings.    Physical Exam     Constitutional: She is oriented to person, place, and time. She appears well-developed and well-nourished. No distress.   HENT:   Head: Normocephalic.   NECK: Neck symmetric without masses or thyromegaly.  Cardiovascular: Normal rate and regular rhythm.   Pulmonary/Chest: Effort normal and breath sounds normal. No respiratory distress. She has no wheezes.   Breasts: Symmetrical, no skin changes or visible lesions. No palpable masses, nipple discharge or adenopathy bilaterally.  Abdominal: Soft not distended. Bowel sounds are normal. She exhibits   no masses . No tenderness to palpation.   Genitourinary: Pelvic exam was performed with patient supine.   External genitalia normal no lesions.Normal hair distribution   Adequate perineal body,normal urethral meatus   Vagina moist and well rugated without lesions, no vaginal  Discharge.   cerxix/uterus absent   Extremities normal no cyanosis ,edema.         A/P Tennille Fortune 46 y.o.     Dx=  1- routine gyn visit   2-Hx hysterectomy   3- vulvovaginitis       Procedures/Orders:  /Mammogram      Assessment / Plan:  -s/p normal breast exam   -s/p normal pelvic exam:    Patient was counseled today on A.C.S. Pap guidelines and recommendations for yearly pelvic exams, mammograms and monthly self breast exams; to see her PCP for other health maintenance, nutrition and weight gain  counseling, discussed lab values.  Discussed colonoscopy recommendations every 10 years starting at age 50   Calcium and vit D daily intake     F/u in 1 yr or PRN      I spent a total of 30 minutes on the day of the visit.This includes face to face time and non-face to face time preparing to see the patient (eg, review of tests), Obtaining and/or reviewing separately obtained history, Documenting clinical information in the electronic or other health record, Independently interpreting resultsand communicating results to the patient/family/caregiver, or Care coordination.      Wesley Myles M.D.   OB/GYN

## 2022-01-20 ENCOUNTER — TELEPHONE (OUTPATIENT)
Dept: OBSTETRICS AND GYNECOLOGY | Facility: CLINIC | Age: 47
End: 2022-01-20
Payer: MEDICAID

## 2022-01-20 LAB
BACTERIAL VAGINOSIS DNA: NEGATIVE
CANDIDA GLABRATA DNA: NEGATIVE
CANDIDA KRUSEI DNA: NEGATIVE
CANDIDA RRNA VAG QL PROBE: POSITIVE
T VAGINALIS RRNA GENITAL QL PROBE: NEGATIVE

## 2022-01-20 RX ORDER — TERCONAZOLE 4 MG/G
1 CREAM VAGINAL NIGHTLY
Qty: 1 EACH | Refills: 0 | Status: SHIPPED | OUTPATIENT
Start: 2022-01-20 | End: 2022-01-27

## 2022-01-21 LAB
C TRACH DNA SPEC QL NAA+PROBE: NOT DETECTED
N GONORRHOEA DNA SPEC QL NAA+PROBE: NOT DETECTED

## 2022-01-21 NOTE — TELEPHONE ENCOUNTER
AFFIRM resulted  Negative Trichomonads   Negative BV   Positive Candida sp    Rx for Diflucan sent to pharmacy   Other cultures ending     Wesley Myles M.D.   OB/GYN    1/20/2022

## 2022-01-24 ENCOUNTER — TELEPHONE (OUTPATIENT)
Dept: OBSTETRICS AND GYNECOLOGY | Facility: CLINIC | Age: 47
End: 2022-01-24
Payer: MEDICAID

## 2022-01-24 NOTE — TELEPHONE ENCOUNTER
----- Message from Wseley Myles MD sent at 1/23/2022 11:14 PM CST -----  Gonorrhea / Chlamydia cultures are negative       Wesley Myles M.D.   OB/GYN

## 2022-04-29 ENCOUNTER — HOSPITAL ENCOUNTER (EMERGENCY)
Facility: HOSPITAL | Age: 47
Discharge: HOME | End: 2022-04-29
Attending: FAMILY MEDICINE
Payer: MEDICAID

## 2022-04-29 VITALS
RESPIRATION RATE: 22 BRPM | TEMPERATURE: 99 F | BODY MASS INDEX: 25.69 KG/M2 | OXYGEN SATURATION: 100 % | WEIGHT: 145 LBS | HEIGHT: 63 IN | DIASTOLIC BLOOD PRESSURE: 66 MMHG | HEART RATE: 104 BPM | SYSTOLIC BLOOD PRESSURE: 106 MMHG

## 2022-04-29 DIAGNOSIS — N30.01 ACUTE CYSTITIS WITH HEMATURIA: Primary | ICD-10-CM

## 2022-04-29 LAB
BACTERIA #/AREA URNS AUTO: ABNORMAL /HPF
BILIRUB UR QL STRIP: NEGATIVE
CLARITY UR REFRACT.AUTO: ABNORMAL
COLOR UR AUTO: YELLOW
GLUCOSE UR QL STRIP: NEGATIVE
HGB UR QL STRIP: ABNORMAL
HYALINE CASTS UR QL AUTO: 2 /LPF
KETONES UR QL STRIP: NEGATIVE
LEUKOCYTE ESTERASE UR QL STRIP: ABNORMAL
MICROSCOPIC COMMENT: ABNORMAL
NITRITE UR QL STRIP: NEGATIVE
PH UR STRIP: 5 [PH] (ref 5–8)
PROT UR QL STRIP: ABNORMAL
RBC #/AREA URNS AUTO: >100 /HPF (ref 0–4)
SP GR UR STRIP: 1.01 (ref 1–1.03)
URN SPEC COLLECT METH UR: ABNORMAL
UROBILINOGEN UR STRIP-ACNC: NEGATIVE EU/DL
WBC #/AREA URNS AUTO: >100 /HPF (ref 0–5)

## 2022-04-29 PROCEDURE — 87086 URINE CULTURE/COLONY COUNT: CPT | Mod: ER | Performed by: FAMILY MEDICINE

## 2022-04-29 PROCEDURE — 87077 CULTURE AEROBIC IDENTIFY: CPT | Mod: ER | Performed by: FAMILY MEDICINE

## 2022-04-29 PROCEDURE — 81000 URINALYSIS NONAUTO W/SCOPE: CPT | Mod: ER | Performed by: FAMILY MEDICINE

## 2022-04-29 PROCEDURE — 99284 EMERGENCY DEPT VISIT MOD MDM: CPT | Mod: 25,ER

## 2022-04-29 PROCEDURE — 87186 SC STD MICRODIL/AGAR DIL: CPT | Mod: ER | Performed by: FAMILY MEDICINE

## 2022-04-29 PROCEDURE — 25000003 PHARM REV CODE 250: Mod: ER | Performed by: FAMILY MEDICINE

## 2022-04-29 PROCEDURE — 87088 URINE BACTERIA CULTURE: CPT | Mod: ER | Performed by: FAMILY MEDICINE

## 2022-04-29 RX ORDER — KETOROLAC TROMETHAMINE 10 MG/1
10 TABLET, FILM COATED ORAL
Status: COMPLETED | OUTPATIENT
Start: 2022-04-29 | End: 2022-04-29

## 2022-04-29 RX ORDER — CIPROFLOXACIN 500 MG/1
500 TABLET ORAL
Status: COMPLETED | OUTPATIENT
Start: 2022-04-29 | End: 2022-04-29

## 2022-04-29 RX ORDER — PHENAZOPYRIDINE HYDROCHLORIDE 200 MG/1
200 TABLET, FILM COATED ORAL 3 TIMES DAILY
Qty: 6 TABLET | Refills: 0 | Status: SHIPPED | OUTPATIENT
Start: 2022-04-29

## 2022-04-29 RX ORDER — PHENAZOPYRIDINE HYDROCHLORIDE 100 MG/1
200 TABLET, FILM COATED ORAL
Status: COMPLETED | OUTPATIENT
Start: 2022-04-29 | End: 2022-04-29

## 2022-04-29 RX ORDER — CIPROFLOXACIN 500 MG/1
500 TABLET ORAL 2 TIMES DAILY
Qty: 20 TABLET | Refills: 0 | Status: SHIPPED | OUTPATIENT
Start: 2022-04-29 | End: 2022-05-09

## 2022-04-29 RX ADMIN — KETOROLAC TROMETHAMINE 10 MG: 10 TABLET, FILM COATED ORAL at 10:04

## 2022-04-29 RX ADMIN — PHENAZOPYRIDINE HYDROCHLORIDE 200 MG: 100 TABLET ORAL at 10:04

## 2022-04-29 RX ADMIN — CIPROFLOXACIN 500 MG: 500 TABLET, FILM COATED ORAL at 10:04

## 2022-04-29 NOTE — ED PROVIDER NOTES
"Encounter Date: 4/29/2022       History     Chief Complaint   Patient presents with    Dysuria     Pt reports "I have blood in my urine. It's a lot of pressure and discomfort." Symptoms started on Monday. No meds.      47-year-old female complains of dysuria and foul-smelling urine since last Sunday.  Complains of pain in her bladder associated with cramps.  No fever chills or rigors.  Symptoms have increased in spite of drinking plenty of fluids and cranberry juice.  Patient claims she saw small amount of blood in the urine today and is concerned.    The history is provided by the patient.     Review of patient's allergies indicates:   Allergen Reactions    Latex, natural rubber Itching     Past Medical History:   Diagnosis Date    GERD (gastroesophageal reflux disease)     History of abdominal pain     Urolithiasis      Past Surgical History:   Procedure Laterality Date    endometriosis      HYSTERECTOMY       Family History   Problem Relation Age of Onset    Asthma Mother     Hypertension Father     Asthma Daughter     Asthma Son     Hypertension Maternal Aunt     Stroke Maternal Grandfather      Social History     Tobacco Use    Smoking status: Never Smoker    Smokeless tobacco: Never Used   Substance Use Topics    Alcohol use: No    Drug use: No     Review of Systems   Constitutional: Negative for activity change, appetite change, chills and fever.   HENT: Negative for congestion, ear discharge, rhinorrhea, sinus pressure, sinus pain, sore throat and trouble swallowing.    Eyes: Negative for photophobia, pain, discharge, redness, itching and visual disturbance.   Respiratory: Negative for cough, chest tightness, shortness of breath and wheezing.    Cardiovascular: Negative for chest pain, palpitations and leg swelling.   Gastrointestinal: Positive for abdominal pain. Negative for abdominal distention, constipation, diarrhea, nausea and vomiting.   Genitourinary: Positive for dysuria, frequency " and hematuria. Negative for flank pain.   Musculoskeletal: Negative for back pain, gait problem, neck pain and neck stiffness.   Skin: Negative for rash and wound.   Neurological: Negative for dizziness, tremors, seizures, syncope, speech difficulty, weakness, light-headedness, numbness and headaches.   Psychiatric/Behavioral: Negative for behavioral problems, confusion, hallucinations and sleep disturbance. The patient is not nervous/anxious.    All other systems reviewed and are negative.      Physical Exam     Initial Vitals [04/29/22 0958]   BP Pulse Resp Temp SpO2   112/75 (!) 117 (!) 22 98.6 °F (37 °C) 100 %      MAP       --         Physical Exam    Nursing note and vitals reviewed.  Constitutional: Vital signs are normal. She appears well-developed and well-nourished. She is active. No distress.   HENT:   Head: Normocephalic.   Nose: Nose normal.   Mouth/Throat: Oropharynx is clear and moist and mucous membranes are normal.   Eyes: Conjunctivae, EOM and lids are normal.   Neck: Neck supple.   Normal range of motion.  Cardiovascular: Normal rate, regular rhythm, S1 normal, S2 normal and normal heart sounds.   Pulmonary/Chest: Breath sounds normal.   Abdominal: Abdomen is soft. Bowel sounds are normal. She exhibits no distension. There is abdominal tenderness in the suprapubic area. There is no rebound and no guarding.   Musculoskeletal:      Right upper arm: Normal.      Left upper arm: Normal.      Cervical back: Normal range of motion and neck supple.      Right lower leg: Normal.      Left lower leg: Normal.     Neurological: She is alert and oriented to person, place, and time. She has normal strength. GCS eye subscore is 4. GCS verbal subscore is 5. GCS motor subscore is 6.   Skin: Skin is warm. Capillary refill takes less than 2 seconds.   Psychiatric: She has a normal mood and affect. Her speech is normal and behavior is normal. Thought content normal. Cognition and memory are normal.         ED Course    Procedures  Labs Reviewed   URINALYSIS, REFLEX TO URINE CULTURE - Abnormal; Notable for the following components:       Result Value    Appearance, UA Cloudy (*)     Protein, UA 2+ (*)     Occult Blood UA 3+ (*)     Leukocytes, UA 3+ (*)     All other components within normal limits    Narrative:     Preferred Collection Type->Urine, Clean Catch  Specimen Source->Urine  Collection Type->Urine, Clean Catch   URINALYSIS MICROSCOPIC - Abnormal; Notable for the following components:    RBC, UA >100 (*)     WBC, UA >100 (*)     Bacteria Few (*)     Hyaline Casts, UA 2 (*)     All other components within normal limits    Narrative:     Preferred Collection Type->Urine, Clean Catch  Specimen Source->Urine  Collection Type->Urine, Clean Catch   CULTURE, URINE          Imaging Results    None          Medications   ciprofloxacin HCl tablet 500 mg (500 mg Oral Given 4/29/22 1057)   phenazopyridine tablet 200 mg (200 mg Oral Given 4/29/22 1051)   ketorolac tablet 10 mg (10 mg Oral Given 4/29/22 1051)     Medical Decision Making:   Clinical Tests:   Lab Tests: Ordered and Reviewed  ED Management:  Urine infection treated with Cipro.  Adequate hydration at home..  A.m. for spasms.  Follow-up PCP or ED with any worsening symptoms.                      Clinical Impression:   Final diagnoses:  [N30.01] Acute cystitis with hematuria (Primary)          ED Disposition Condition    Discharge Stable        ED Prescriptions     Medication Sig Dispense Start Date End Date Auth. Provider    ciprofloxacin HCl (CIPRO) 500 MG tablet Take 1 tablet (500 mg total) by mouth 2 (two) times daily. for 10 days 20 tablet 4/29/2022 5/9/2022 Yousif Khan MD    phenazopyridine (PYRIDIUM) 200 MG tablet Take 1 tablet (200 mg total) by mouth 3 (three) times daily. 6 tablet 4/29/2022  Yousif Khan MD        Follow-up Information     Follow up With Specialties Details Why Contact Info    Joy Matias MD Family Medicine Schedule an  appointment as soon as possible for a visit in 3 days If symptoms worsen [address]  [address]  [place] [id number]  [phone]             Yousif Khan MD  04/29/22 9906

## 2022-04-29 NOTE — ED NOTES
Patient reports she began experiencing abd pain and pressure in her bladder on Sunday. Symptoms have progressed and she now also has urgency to urinate, foul odor to urine, blood in urine and burning. She also reports nausea and weakness today but denies fever. She is awake/ alert and ambulatory with mild distress noted.

## 2022-05-02 LAB — BACTERIA UR CULT: ABNORMAL

## 2023-08-25 ENCOUNTER — HOSPITAL ENCOUNTER (EMERGENCY)
Facility: HOSPITAL | Age: 48
Discharge: HOME | End: 2023-08-25
Attending: EMERGENCY MEDICINE
Payer: MEDICAID

## 2023-08-25 VITALS
HEIGHT: 63 IN | WEIGHT: 140 LBS | OXYGEN SATURATION: 100 % | BODY MASS INDEX: 24.8 KG/M2 | DIASTOLIC BLOOD PRESSURE: 65 MMHG | TEMPERATURE: 98 F | SYSTOLIC BLOOD PRESSURE: 109 MMHG | RESPIRATION RATE: 17 BRPM | HEART RATE: 67 BPM

## 2023-08-25 DIAGNOSIS — T88.7XXA MEDICATION SIDE EFFECTS: Primary | ICD-10-CM

## 2023-08-25 DIAGNOSIS — R53.1 WEAKNESS: ICD-10-CM

## 2023-08-25 LAB
ALBUMIN SERPL BCP-MCNC: 4.4 G/DL (ref 3.5–5.2)
ALP SERPL-CCNC: 82 U/L (ref 38–126)
ALT SERPL W/O P-5'-P-CCNC: 19 U/L (ref 10–44)
ANION GAP SERPL CALC-SCNC: 14 MMOL/L (ref 8–16)
AST SERPL-CCNC: 22 U/L (ref 15–46)
BASOPHILS # BLD AUTO: 0.06 K/UL (ref 0–0.2)
BASOPHILS NFR BLD: 1 % (ref 0–1.9)
BILIRUB SERPL-MCNC: 0.5 MG/DL (ref 0.1–1)
BILIRUB UR QL STRIP: NEGATIVE
CALCIUM SERPL-MCNC: 9.5 MG/DL (ref 8.7–10.5)
CHLORIDE SERPL-SCNC: 105 MMOL/L (ref 95–110)
CK SERPL-CCNC: 107 U/L (ref 55–170)
CLARITY UR REFRACT.AUTO: CLEAR
CO2 SERPL-SCNC: 21 MMOL/L (ref 23–29)
COLOR UR AUTO: YELLOW
CREAT SERPL-MCNC: 0.87 MG/DL (ref 0.5–1.4)
CTP QC/QA: YES
CTP QC/QA: YES
D DIMER PPP IA.FEU-MCNC: 1.47 MG/L FEU
DIFFERENTIAL METHOD: ABNORMAL
EOSINOPHIL # BLD AUTO: 0.1 K/UL (ref 0–0.5)
EOSINOPHIL NFR BLD: 1.9 % (ref 0–8)
ERYTHROCYTE [DISTWIDTH] IN BLOOD BY AUTOMATED COUNT: 12.3 % (ref 11.5–14.5)
EST. GFR  (NO RACE VARIABLE): >60 ML/MIN/1.73 M^2
GLUCOSE SERPL-MCNC: 96 MG/DL (ref 70–110)
GLUCOSE UR QL STRIP: NEGATIVE
GROUP A STREP, MOLECULAR: NEGATIVE
HCT VFR BLD AUTO: 37.3 % (ref 37–48.5)
HETEROPH AB SERPL QL IA: NEGATIVE
HGB BLD-MCNC: 12.9 G/DL (ref 12–16)
HGB UR QL STRIP: NEGATIVE
IMM GRANULOCYTES # BLD AUTO: 0.01 K/UL (ref 0–0.04)
IMM GRANULOCYTES NFR BLD AUTO: 0.2 % (ref 0–0.5)
KETONES UR QL STRIP: NEGATIVE
LEUKOCYTE ESTERASE UR QL STRIP: NEGATIVE
LYMPHOCYTES # BLD AUTO: 2.4 K/UL (ref 1–4.8)
LYMPHOCYTES NFR BLD: 38.4 % (ref 18–48)
MCH RBC QN AUTO: 27.9 PG (ref 27–31)
MCHC RBC AUTO-ENTMCNC: 34.6 G/DL (ref 32–36)
MCV RBC AUTO: 81 FL (ref 82–98)
MONOCYTES # BLD AUTO: 0.6 K/UL (ref 0.3–1)
MONOCYTES NFR BLD: 10 % (ref 4–15)
NEUTROPHILS # BLD AUTO: 3 K/UL (ref 1.8–7.7)
NEUTROPHILS NFR BLD: 48.5 % (ref 38–73)
NITRITE UR QL STRIP: NEGATIVE
NRBC BLD-RTO: 0 /100 WBC
PH UR STRIP: 6 [PH] (ref 5–8)
PLATELET # BLD AUTO: 242 K/UL (ref 150–450)
PMV BLD AUTO: 11.4 FL (ref 9.2–12.9)
POC MOLECULAR INFLUENZA A AGN: NEGATIVE
POC MOLECULAR INFLUENZA B AGN: NEGATIVE
POTASSIUM SERPL-SCNC: 3.5 MMOL/L (ref 3.5–5.1)
PROT SERPL-MCNC: 7.8 G/DL (ref 6–8.4)
PROT UR QL STRIP: NEGATIVE
RBC # BLD AUTO: 4.63 M/UL (ref 4–5.4)
SARS-COV-2 RDRP RESP QL NAA+PROBE: NEGATIVE
SODIUM SERPL-SCNC: 140 MMOL/L (ref 136–145)
SP GR UR STRIP: <=1.005 (ref 1–1.03)
TROPONIN I SERPL-MCNC: <0.012 NG/ML (ref 0.01–0.03)
URN SPEC COLLECT METH UR: NORMAL
UROBILINOGEN UR STRIP-ACNC: NEGATIVE EU/DL
UUN UR-MCNC: 13 MG/DL (ref 7–17)
WBC # BLD AUTO: 6.17 K/UL (ref 3.9–12.7)

## 2023-08-25 PROCEDURE — 93010 ELECTROCARDIOGRAM REPORT: CPT | Mod: ,,, | Performed by: INTERNAL MEDICINE

## 2023-08-25 PROCEDURE — 96374 THER/PROPH/DIAG INJ IV PUSH: CPT | Mod: ER

## 2023-08-25 PROCEDURE — 84484 ASSAY OF TROPONIN QUANT: CPT | Mod: ER | Performed by: EMERGENCY MEDICINE

## 2023-08-25 PROCEDURE — 87651 STREP A DNA AMP PROBE: CPT | Mod: ER | Performed by: EMERGENCY MEDICINE

## 2023-08-25 PROCEDURE — 99285 EMERGENCY DEPT VISIT HI MDM: CPT | Mod: 25,ER

## 2023-08-25 PROCEDURE — 86308 HETEROPHILE ANTIBODY SCREEN: CPT | Mod: ER | Performed by: EMERGENCY MEDICINE

## 2023-08-25 PROCEDURE — 93005 ELECTROCARDIOGRAM TRACING: CPT | Mod: ER

## 2023-08-25 PROCEDURE — 87635 SARS-COV-2 COVID-19 AMP PRB: CPT | Mod: ER | Performed by: EMERGENCY MEDICINE

## 2023-08-25 PROCEDURE — 80053 COMPREHEN METABOLIC PANEL: CPT | Mod: ER | Performed by: EMERGENCY MEDICINE

## 2023-08-25 PROCEDURE — 81003 URINALYSIS AUTO W/O SCOPE: CPT | Mod: ER | Performed by: EMERGENCY MEDICINE

## 2023-08-25 PROCEDURE — 25000003 PHARM REV CODE 250: Mod: ER | Performed by: EMERGENCY MEDICINE

## 2023-08-25 PROCEDURE — 96375 TX/PRO/DX INJ NEW DRUG ADDON: CPT | Mod: ER

## 2023-08-25 PROCEDURE — 93010 EKG 12-LEAD: ICD-10-PCS | Mod: ,,, | Performed by: INTERNAL MEDICINE

## 2023-08-25 PROCEDURE — 25500020 PHARM REV CODE 255: Mod: ER | Performed by: EMERGENCY MEDICINE

## 2023-08-25 PROCEDURE — 85379 FIBRIN DEGRADATION QUANT: CPT | Mod: ER | Performed by: EMERGENCY MEDICINE

## 2023-08-25 PROCEDURE — 96361 HYDRATE IV INFUSION ADD-ON: CPT | Mod: ER

## 2023-08-25 PROCEDURE — 63600175 PHARM REV CODE 636 W HCPCS: Mod: ER | Performed by: EMERGENCY MEDICINE

## 2023-08-25 PROCEDURE — 82550 ASSAY OF CK (CPK): CPT | Mod: ER | Performed by: EMERGENCY MEDICINE

## 2023-08-25 PROCEDURE — 87502 INFLUENZA DNA AMP PROBE: CPT | Mod: ER

## 2023-08-25 PROCEDURE — 85025 COMPLETE CBC W/AUTO DIFF WBC: CPT | Mod: ER | Performed by: EMERGENCY MEDICINE

## 2023-08-25 RX ORDER — PREDNISONE 20 MG/1
20 TABLET ORAL 2 TIMES DAILY
Qty: 10 TABLET | Refills: 0 | Status: SHIPPED | OUTPATIENT
Start: 2023-08-25

## 2023-08-25 RX ORDER — DROPERIDOL 2.5 MG/ML
0.62 INJECTION, SOLUTION INTRAMUSCULAR; INTRAVENOUS ONCE
Status: COMPLETED | OUTPATIENT
Start: 2023-08-25 | End: 2023-08-25

## 2023-08-25 RX ORDER — DEXAMETHASONE SODIUM PHOSPHATE 4 MG/ML
8 INJECTION, SOLUTION INTRA-ARTICULAR; INTRALESIONAL; INTRAMUSCULAR; INTRAVENOUS; SOFT TISSUE
Status: COMPLETED | OUTPATIENT
Start: 2023-08-25 | End: 2023-08-25

## 2023-08-25 RX ADMIN — IOHEXOL 100 ML: 350 INJECTION, SOLUTION INTRAVENOUS at 06:08

## 2023-08-25 RX ADMIN — DROPERIDOL 0.62 MG: 2.5 INJECTION, SOLUTION INTRAMUSCULAR; INTRAVENOUS at 05:08

## 2023-08-25 RX ADMIN — DEXAMETHASONE SODIUM PHOSPHATE 8 MG: 4 INJECTION, SOLUTION INTRA-ARTICULAR; INTRALESIONAL; INTRAMUSCULAR; INTRAVENOUS; SOFT TISSUE at 05:08

## 2023-08-25 RX ADMIN — SODIUM CHLORIDE 1000 ML: 9 INJECTION, SOLUTION INTRAVENOUS at 05:08

## 2023-08-25 NOTE — ED PROVIDER NOTES
"Chief Complaint: not feeling well     History of Present Illness:    Tennille Fortune 48 y.o. with a  has a past medical history of GERD (gastroesophageal reflux disease), History of abdominal pain, and Urolithiasis. who presents to the emergency department today with a complaint of not feeling well.  Pt reports having all over body aches, joint pain not feeling well since the "weekend" (5-6 days). Gradually getting worse. She had nausea and vomiting 2 days ago. No diarrhea.  Urine is dark.  She has had cough. No sputum.  She has also had chest pressure that started last night, some shortness of breath today and upper abdominal pain. She had 2 shots on Monday for allergy to grasses. She reports however that she was not feeling well prior to those shots although she did get worse afterward.  Took motrin 1 time yesterday night.         Review of Systems   HENT:  Negative for sinus pain.    Gastrointestinal:  Negative for constipation and diarrhea.   Genitourinary:  Negative for dysuria, frequency and urgency.   Musculoskeletal:  Positive for joint pain and myalgias.   Skin:  Negative for itching and rash.   Neurological:  Negative for focal weakness and headaches.       Otherwise remaining ROS negative     The history is provided by the patient      Reviewed and verified by myself:   PMH/PSH/SOC/FH REVIEWED :    Past Medical History:   Diagnosis Date    GERD (gastroesophageal reflux disease)     History of abdominal pain     Urolithiasis        Past Surgical History:   Procedure Laterality Date    endometriosis      HYSTERECTOMY         Social History     Socioeconomic History    Marital status: Single   Tobacco Use    Smoking status: Never    Smokeless tobacco: Never   Substance and Sexual Activity    Alcohol use: No    Drug use: No    Sexual activity: Not Currently     Partners: Male       Family History   Problem Relation Age of Onset    Asthma Mother     Hypertension Father     Asthma Daughter     Asthma Son     " Hypertension Maternal Aunt     Stroke Maternal Grandfather                ALLERGIES REVIEWED  Review of patient's allergies indicates:   Allergen Reactions    Latex, natural rubber Itching       MEDICATIONS REVIEWED  Medication List with Changes/Refills   Current Medications    CLOTRIMAZOLE-BETAMETHASONE 1-0.05% (LOTRISONE) CREAM    Apply topically 2 (two) times daily.    FAMOTIDINE (PEPCID) 20 MG TABLET    Take 1 tablet (20 mg total) by mouth 2 (two) times daily.    ONDANSETRON (ZOFRAN) 4 MG TABLET    Take 1 tablet (4 mg total) by mouth every 6 (six) hours as needed for Nausea.    PANTOPRAZOLE (PROTONIX) 20 MG TABLET    Take 2 tablets (40 mg total) by mouth once daily.    PHENAZOPYRIDINE (PYRIDIUM) 200 MG TABLET    Take 1 tablet (200 mg total) by mouth 3 (three) times daily.           VS reviewed    Nursing/Ancillary staff note reviewed.       Physical Exam     ED Triage Vitals [08/25/23 0404]   /76   Pulse 74   Resp 18   Temp 98.4 °F (36.9 °C)   SpO2 99 %       Physical Exam    Constitutional: The patient is alert, appears to not feel well, she is tearful.  Lying on her side, shaking back and forth.   Eyes: Pupils equal and round no pallor or injection. Extra ocular movements intact. No drainage.   ENT: Mucous membranes are moist. Oropharynx clear.   Neck: Neck is supple non-tender. No lymphadenopathy. No stridor.   Respiratory: There are no retractions, lungs are clear to auscultation. No wheezing, no crackles.   Cardiovascular: Regular rate and rhythm. No murmurs, rubs or gallops.  Gastrointestinal:  Abdomen is soft and non-tender, no masses, bowel sounds normal. No guarding, no rebound.  No pulsatile mass.   Neurological: Alert and oriented x 4. CN II-XII grossly intact. No focal weakness. Strength intact 5/5 bilaterally in upper and lower extremities.   Skin: Warm and dry, no rashes.  Musculoskeletal: Extremities are non-tender, non-swollen and have full range of motion. Back NTTP along the midline.          ED Course         ED Course as of 08/26/23 0621   Fri Aug 25, 2023   0442 POC Molecular Influenza A Ag: Negative [JA]   0442 POC Molecular Influenza B Ag: Negative [JA]   0442 SARS-CoV-2 RNA, Amplification, Qual: Negative [JA]   0600 D-Dimer(!): 1.47  Will obtain CTA chest PE study   [JA]   0601 CBC CMP CK TROPONIN NORMAL>   Mono neg   [JA]   0601 PT care turned over to Dr Khan at [facility].  [JA]      ED Course User Index  [JA] Bruno Pablo MD              ED Management:            Medical Decision Making      Differential diagnosis is wide given her range of symptoms and   included but not limited to : infectious, viral vs bacterial, metabolic derangement, STEFAN/ARF, rhabdomyolysis, PE, MI, intraabdominal etiology, delayed or biphasic reaction to the SCIT therapy.     Initial: This is a 48 y.o. female  with  has a past medical history of GERD (gastroesophageal reflux disease), History of abdominal pain, and Urolithiasis. who comes in for emergent evaluation of a new, acute, complicated and undiagnosed problem. On examination vitals are stable. Afebrile, normal rate, O2 sats appropriate on room air. Physcial exam shows clear clear breath sounds, RRR, no focal tenderness to abdomen.     Orders I ordered to further evaluate included: CBC, CMP, CK, troponin, d-dimer, UA, mono, covid, flu      Amount and/or Complexity of Data Reviewed  Labs: ordered. Decision-making details documented in ED Course.  Radiology: ordered.    Risk  Prescription drug management.        Pt received the following in the ED:   Medications   sodium chloride 0.9% bolus 1,000 mL 1,000 mL (0 mLs Intravenous Stopped 8/25/23 0550)   droPERidol injection 0.625 mg (0.625 mg Intravenous Given 8/25/23 0510)   dexAMETHasone injection 8 mg (8 mg Intravenous Given 8/25/23 0519)   iohexoL (OMNIPAQUE 350) injection 100 mL (100 mLs Intravenous Given 8/25/23 0615)         Voice recognition software utilized in this  note.      Procedures          Impression      The primary encounter diagnosis was Medication side effects. A diagnosis of Weakness was also pertinent to this visit.                Discharge Medication List as of 8/25/2023  7:42 AM        START taking these medications    Details   predniSONE (DELTASONE) 20 MG tablet Take 1 tablet (20 mg total) by mouth 2 (two) times daily., Starting Fri 8/25/2023, Normal              Follow-up Information       Joy Matias MD. Schedule an appointment as soon as possible for a visit in 2 days.    Specialty: Family Medicine  Why: For  re-check  Contact information:  [address]  [address]  [place] [address] [phone]                                  Bruno Pablo MD  08/26/23 4781

## 2024-01-05 ENCOUNTER — HOSPITAL ENCOUNTER (EMERGENCY)
Facility: HOSPITAL | Age: 49
Discharge: HOME | End: 2024-01-05
Attending: FAMILY MEDICINE
Payer: MEDICAID

## 2024-01-05 VITALS
RESPIRATION RATE: 20 BRPM | DIASTOLIC BLOOD PRESSURE: 74 MMHG | OXYGEN SATURATION: 99 % | TEMPERATURE: 98 F | WEIGHT: 150 LBS | HEIGHT: 63 IN | HEART RATE: 104 BPM | BODY MASS INDEX: 26.58 KG/M2 | SYSTOLIC BLOOD PRESSURE: 144 MMHG

## 2024-01-05 DIAGNOSIS — J06.9 UPPER RESPIRATORY TRACT INFECTION, UNSPECIFIED TYPE: ICD-10-CM

## 2024-01-05 DIAGNOSIS — J40 BRONCHITIS: Primary | ICD-10-CM

## 2024-01-05 DIAGNOSIS — R05.1 ACUTE COUGH: ICD-10-CM

## 2024-01-05 LAB
INFLUENZA A, MOLECULAR: NEGATIVE
INFLUENZA B, MOLECULAR: NEGATIVE
SARS-COV-2 RDRP RESP QL NAA+PROBE: NEGATIVE
SPECIMEN SOURCE: NORMAL

## 2024-01-05 PROCEDURE — 96372 THER/PROPH/DIAG INJ SC/IM: CPT | Performed by: PHYSICIAN ASSISTANT

## 2024-01-05 PROCEDURE — 63600175 PHARM REV CODE 636 W HCPCS: Mod: ER | Performed by: PHYSICIAN ASSISTANT

## 2024-01-05 PROCEDURE — 87502 INFLUENZA DNA AMP PROBE: CPT | Mod: ER | Performed by: FAMILY MEDICINE

## 2024-01-05 PROCEDURE — U0002 COVID-19 LAB TEST NON-CDC: HCPCS | Mod: ER | Performed by: FAMILY MEDICINE

## 2024-01-05 PROCEDURE — 99284 EMERGENCY DEPT VISIT MOD MDM: CPT | Mod: 25,ER

## 2024-01-05 RX ORDER — ONDANSETRON 4 MG/1
4 TABLET, FILM COATED ORAL EVERY 6 HOURS
Qty: 12 TABLET | Refills: 0 | Status: SHIPPED | OUTPATIENT
Start: 2024-01-05

## 2024-01-05 RX ORDER — DEXAMETHASONE SODIUM PHOSPHATE 4 MG/ML
8 INJECTION, SOLUTION INTRA-ARTICULAR; INTRALESIONAL; INTRAMUSCULAR; INTRAVENOUS; SOFT TISSUE
Status: COMPLETED | OUTPATIENT
Start: 2024-01-05 | End: 2024-01-05

## 2024-01-05 RX ORDER — BENZONATATE 100 MG/1
100 CAPSULE ORAL 3 TIMES DAILY PRN
Qty: 20 CAPSULE | Refills: 0 | Status: SHIPPED | OUTPATIENT
Start: 2024-01-05 | End: 2024-01-15

## 2024-01-05 RX ORDER — AZITHROMYCIN 250 MG/1
TABLET, FILM COATED ORAL
Qty: 6 TABLET | Refills: 0 | Status: SHIPPED | OUTPATIENT
Start: 2024-01-05 | End: 2024-01-10

## 2024-01-05 RX ORDER — PREDNISONE 20 MG/1
40 TABLET ORAL DAILY
Qty: 10 TABLET | Refills: 0 | Status: SHIPPED | OUTPATIENT
Start: 2024-01-05 | End: 2024-01-10

## 2024-01-05 RX ADMIN — DEXAMETHASONE SODIUM PHOSPHATE 8 MG: 4 INJECTION, SOLUTION INTRA-ARTICULAR; INTRALESIONAL; INTRAMUSCULAR; INTRAVENOUS; SOFT TISSUE at 09:01

## 2024-01-06 NOTE — ED PROVIDER NOTES
Encounter Date: 1/5/2024       History     Chief Complaint   Patient presents with    Cough    Fever     Pt to the Er with c/o productive cough, fever, and body aches for 2 weeks. Pt reports to taking Dayquil, Niquil, and Thera-flu with no relief.      This is a 48-year-old  female with significant past medical history of GERD and urolithiasis that presents the ED with complaint of 2 weeks of fever, runny nose, congestion, sore throat, cough, chest pain with coughing, shortness for breath.  She admits to multiple sick family members over the course of her symptoms and before but none of them has been evaluated by a provider.  She states she is tried various over-the-counter medications with limited success.  She denies any abdominal pain, nausea, diarrhea.  She denies any urinary symptoms.      Review of patient's allergies indicates:   Allergen Reactions    Latex, natural rubber Itching     Past Medical History:   Diagnosis Date    GERD (gastroesophageal reflux disease)     History of abdominal pain     Urolithiasis      Past Surgical History:   Procedure Laterality Date    endometriosis      HYSTERECTOMY       Family History   Problem Relation Age of Onset    Asthma Mother     Hypertension Father     Asthma Daughter     Asthma Son     Hypertension Maternal Aunt     Stroke Maternal Grandfather      Social History     Tobacco Use    Smoking status: Never    Smokeless tobacco: Never   Substance Use Topics    Alcohol use: No    Drug use: No     Review of Systems   Constitutional:  Positive for appetite change, chills, diaphoresis, fatigue and fever.   HENT:  Positive for congestion, rhinorrhea and sore throat.    Eyes:  Negative for redness.   Respiratory:  Positive for cough and shortness of breath.    Cardiovascular:  Negative for chest pain and palpitations.   Gastrointestinal:  Positive for nausea. Negative for diarrhea and vomiting.   Musculoskeletal:  Positive for back pain.       Physical Exam      Initial Vitals [01/05/24 2019]   BP Pulse Resp Temp SpO2   (!) 144/74 104 20 98.3 °F (36.8 °C) 99 %      MAP       --         Physical Exam    Constitutional: She appears well-developed and well-nourished.   HENT:   Head: Normocephalic and atraumatic.   Right Ear: Hearing, tympanic membrane, external ear and ear canal normal.   Left Ear: Hearing, tympanic membrane, external ear and ear canal normal.   Nose: Mucosal edema and rhinorrhea present.   Mouth/Throat: Uvula is midline and mucous membranes are normal. Posterior oropharyngeal erythema present. No oropharyngeal exudate, posterior oropharyngeal edema or tonsillar abscesses.   Cardiovascular:  Normal rate, regular rhythm and normal heart sounds.           Pulmonary/Chest: Breath sounds normal. She has no wheezes.     Neurological: She is alert and oriented to person, place, and time.   Psychiatric: She has a normal mood and affect. Thought content normal.         ED Course   Procedures  Labs Reviewed   INFLUENZA A & B BY MOLECULAR   SARS-COV-2 RNA AMPLIFICATION, QUAL    Narrative:     Is the patient symptomatic?->Yes          Imaging Results              X-Ray Chest AP Portable (Final result)  Result time 01/05/24 20:39:08      Final result by Carlito Frank MD (01/05/24 20:39:08)                   Impression:      No acute finding in the chest.      Electronically signed by: Carlito Frank  Date:    01/05/2024  Time:    20:39               Narrative:    EXAMINATION:  XR CHEST AP PORTABLE    CLINICAL HISTORY:  cough;    FINDINGS:  Comparison: 07/23/2021.    Mediastinal silhouette is within normal limits.  The lungs are clear.  No pneumothorax or pleural effusion.  No acute osseous finding.                                    X-Rays:   Independently Interpreted Readings:   Other Readings:  Chest x-ray was independently interpreted by me and shows no acute cardiopulmonary process.    Medications   dexAMETHasone injection 8 mg (has no administration in time  range)     Medical Decision Making  This is a 48-year-old  female that presents to ED with complaint of 2 weeks of fever, rhinorrhea, congestion, sore throat, cough, chest pain with coughing, nausea without vomiting, and body aches.  On arrival the patient is afebrile and nontoxic-appearing with stable vital signs.  Her oxygen saturation is 99% on room air.  Differential diagnoses include but are not limited to:  Bronchitis, viral URI with cough, COVID-19, influenza, influenza B, viral syndrome.  Please see physical exam for further details.  My believe is the patient likely had COVID, flu, or another viral illness causing fever and upper respiratory symptoms and following getting over that she ended up with a bacterial bronchitis versus sinusitis.  Given that she has been sick for 2 weeks and has not improved with over-the-counter medications the patient will be placed on azithromycin, Tessalon Perles, Zofran, and prednisone.  Patient was given an intramuscular shot of Decadron in the ED tonight and is instructed to begin her prednisone tomorrow morning.  She is to have PCP follow up in the next 2-3 days or return to the ED for worsening.  She verbalized understanding and was agreeable to the treatment plan.    Amount and/or Complexity of Data Reviewed  Radiology: ordered.                                      Clinical Impression:  Final diagnoses:  [J40] Bronchitis (Primary)  [R05.1] Acute cough  [J06.9] Upper respiratory tract infection, unspecified type          ED Disposition Condition    Discharge Stable          ED Prescriptions       Medication Sig Dispense Start Date End Date Auth. Provider    azithromycin (Z-RINA) 250 MG tablet Take 2 tablets by mouth on day 1; Take 1 tablet by mouth on days 2-5 6 tablet 1/5/2024 1/10/2024 Dustin Goodrich PA-C    benzonatate (TESSALON) 100 MG capsule Take 1 capsule (100 mg total) by mouth 3 (three) times daily as needed. 20 capsule 1/5/2024 1/15/2024 Kp  Dustin JAQUEZ PA-C    predniSONE (DELTASONE) 20 MG tablet Take 2 tablets (40 mg total) by mouth once daily. for 5 days 10 tablet 1/5/2024 1/10/2024 Dustin Goodrich PA-C    ondansetron (ZOFRAN) 4 MG tablet Take 1 tablet (4 mg total) by mouth every 6 (six) hours. 12 tablet 1/5/2024 -- Dustin Goodrich PA-C          Follow-up Information       Follow up With Specialties Details Why Contact Info    Joy Matias MD Family Medicine In 2 days  [address]  [address]  [facility] [address] [phone]      [facility] - Emergency Dept Emergency Medicine  If symptoms worsen [address] [facility] [address] [phone]             Dustin Goodrich PA-C  01/05/24 1635

## 2024-03-04 ENCOUNTER — TELEPHONE (OUTPATIENT)
Dept: FAMILY MEDICINE | Facility: CLINIC | Age: 49
End: 2024-03-04
Payer: MEDICAID

## 2024-03-04 NOTE — TELEPHONE ENCOUNTER
Called and spoke to pt informed her that we are not accepting any new medicaid pts. Provided the number to [facility]. Pt verbalized understanding

## 2024-03-04 NOTE — TELEPHONE ENCOUNTER
----- Message from Trupti Valles sent at 3/4/2024 12:12 PM CST -----  Needs advice from nurse:      Who Called:pt  Regarding:pt would like to est care and be seen for her right thumb causing pain and going numb  Would the patient rather a call back or [facility]IA [facility]?  Best Call Back number: [phone]  Additional Info:

## 2024-06-03 ENCOUNTER — HOSPITAL ENCOUNTER (EMERGENCY)
Facility: HOSPITAL | Age: 49
Discharge: HOME | End: 2024-06-03
Attending: EMERGENCY MEDICINE
Payer: MEDICAID

## 2024-06-03 VITALS
OXYGEN SATURATION: 98 % | WEIGHT: 150 LBS | SYSTOLIC BLOOD PRESSURE: 123 MMHG | HEIGHT: 63 IN | HEART RATE: 89 BPM | DIASTOLIC BLOOD PRESSURE: 73 MMHG | RESPIRATION RATE: 16 BRPM | TEMPERATURE: 98 F | BODY MASS INDEX: 26.58 KG/M2

## 2024-06-03 DIAGNOSIS — R10.30 LOWER ABDOMINAL PAIN: Primary | ICD-10-CM

## 2024-06-03 LAB
ALBUMIN SERPL BCP-MCNC: 5.1 G/DL (ref 3.5–5.2)
ALP SERPL-CCNC: 97 U/L (ref 38–126)
ALT SERPL W/O P-5'-P-CCNC: 16 U/L (ref 10–44)
ANION GAP SERPL CALC-SCNC: 10 MMOL/L (ref 8–16)
AST SERPL-CCNC: 21 U/L (ref 15–46)
BASOPHILS # BLD AUTO: 0.08 K/UL (ref 0–0.2)
BASOPHILS NFR BLD: 1.1 % (ref 0–1.9)
BILIRUB SERPL-MCNC: 0.6 MG/DL (ref 0.1–1)
BILIRUB UR QL STRIP: NEGATIVE
CALCIUM SERPL-MCNC: 10 MG/DL (ref 8.7–10.5)
CHLORIDE SERPL-SCNC: 106 MMOL/L (ref 95–110)
CLARITY UR REFRACT.AUTO: CLEAR
CO2 SERPL-SCNC: 27 MMOL/L (ref 23–29)
COLOR UR AUTO: YELLOW
CREAT SERPL-MCNC: 0.88 MG/DL (ref 0.5–1.4)
DIFFERENTIAL METHOD BLD: ABNORMAL
EOSINOPHIL # BLD AUTO: 0.1 K/UL (ref 0–0.5)
EOSINOPHIL NFR BLD: 1.7 % (ref 0–8)
ERYTHROCYTE [DISTWIDTH] IN BLOOD BY AUTOMATED COUNT: 12.8 % (ref 11.5–14.5)
EST. GFR  (NO RACE VARIABLE): >60 ML/MIN/1.73 M^2
GLUCOSE SERPL-MCNC: 100 MG/DL (ref 70–110)
GLUCOSE UR QL STRIP: NEGATIVE
HCT VFR BLD AUTO: 41.4 % (ref 37–48.5)
HGB BLD-MCNC: 14.1 G/DL (ref 12–16)
HGB UR QL STRIP: NEGATIVE
IMM GRANULOCYTES # BLD AUTO: 0.02 K/UL (ref 0–0.04)
IMM GRANULOCYTES NFR BLD AUTO: 0.3 % (ref 0–0.5)
KETONES UR QL STRIP: NEGATIVE
LEUKOCYTE ESTERASE UR QL STRIP: NEGATIVE
LIPASE SERPL-CCNC: 177 U/L (ref 23–300)
LYMPHOCYTES # BLD AUTO: 1.9 K/UL (ref 1–4.8)
LYMPHOCYTES NFR BLD: 24.6 % (ref 18–48)
MCH RBC QN AUTO: 27.3 PG (ref 27–31)
MCHC RBC AUTO-ENTMCNC: 34.1 G/DL (ref 32–36)
MCV RBC AUTO: 80 FL (ref 82–98)
MONOCYTES # BLD AUTO: 0.6 K/UL (ref 0.3–1)
MONOCYTES NFR BLD: 7.6 % (ref 4–15)
NEUTROPHILS # BLD AUTO: 4.9 K/UL (ref 1.8–7.7)
NEUTROPHILS NFR BLD: 64.7 % (ref 38–73)
NITRITE UR QL STRIP: NEGATIVE
NRBC BLD-RTO: 0 /100 WBC
PH UR STRIP: 8 [PH] (ref 5–8)
PLATELET # BLD AUTO: 312 K/UL (ref 150–450)
PMV BLD AUTO: 10.8 FL (ref 9.2–12.9)
POTASSIUM SERPL-SCNC: 4.4 MMOL/L (ref 3.5–5.1)
PROT SERPL-MCNC: 8.8 G/DL (ref 6–8.4)
PROT UR QL STRIP: NEGATIVE
RBC # BLD AUTO: 5.16 M/UL (ref 4–5.4)
SODIUM SERPL-SCNC: 143 MMOL/L (ref 136–145)
SP GR UR STRIP: 1.01 (ref 1–1.03)
URN SPEC COLLECT METH UR: NORMAL
UROBILINOGEN UR STRIP-ACNC: NEGATIVE EU/DL
UUN UR-MCNC: 15 MG/DL (ref 7–17)
WBC # BLD AUTO: 7.59 K/UL (ref 3.9–12.7)

## 2024-06-03 PROCEDURE — 85025 COMPLETE CBC W/AUTO DIFF WBC: CPT | Mod: ER | Performed by: EMERGENCY MEDICINE

## 2024-06-03 PROCEDURE — 99285 EMERGENCY DEPT VISIT HI MDM: CPT | Mod: 25,ER

## 2024-06-03 PROCEDURE — 96361 HYDRATE IV INFUSION ADD-ON: CPT | Mod: ER

## 2024-06-03 PROCEDURE — 25000003 PHARM REV CODE 250: Mod: ER | Performed by: EMERGENCY MEDICINE

## 2024-06-03 PROCEDURE — 83690 ASSAY OF LIPASE: CPT | Mod: ER | Performed by: EMERGENCY MEDICINE

## 2024-06-03 PROCEDURE — 63600175 PHARM REV CODE 636 W HCPCS: Mod: ER | Performed by: EMERGENCY MEDICINE

## 2024-06-03 PROCEDURE — 96374 THER/PROPH/DIAG INJ IV PUSH: CPT | Mod: ER

## 2024-06-03 PROCEDURE — 81003 URINALYSIS AUTO W/O SCOPE: CPT | Mod: ER | Performed by: EMERGENCY MEDICINE

## 2024-06-03 PROCEDURE — 80053 COMPREHEN METABOLIC PANEL: CPT | Mod: ER | Performed by: EMERGENCY MEDICINE

## 2024-06-03 RX ORDER — ONDANSETRON HYDROCHLORIDE 2 MG/ML
4 INJECTION, SOLUTION INTRAVENOUS
Status: COMPLETED | OUTPATIENT
Start: 2024-06-03 | End: 2024-06-03

## 2024-06-03 RX ORDER — ALUMINUM HYDROXIDE, MAGNESIUM HYDROXIDE, AND SIMETHICONE 1200; 120; 1200 MG/30ML; MG/30ML; MG/30ML
30 SUSPENSION ORAL
Status: COMPLETED | OUTPATIENT
Start: 2024-06-03 | End: 2024-06-03

## 2024-06-03 RX ORDER — DICYCLOMINE HYDROCHLORIDE 20 MG/1
20 TABLET ORAL 2 TIMES DAILY
Qty: 20 TABLET | Refills: 0 | Status: SHIPPED | OUTPATIENT
Start: 2024-06-03 | End: 2024-07-03

## 2024-06-03 RX ORDER — DICYCLOMINE HYDROCHLORIDE 10 MG/1
20 CAPSULE ORAL
Status: COMPLETED | OUTPATIENT
Start: 2024-06-03 | End: 2024-06-03

## 2024-06-03 RX ORDER — PANTOPRAZOLE SODIUM 20 MG/1
20 TABLET, DELAYED RELEASE ORAL DAILY
Qty: 30 TABLET | Refills: 0 | Status: SHIPPED | OUTPATIENT
Start: 2024-06-03 | End: 2025-06-03

## 2024-06-03 RX ADMIN — DICYCLOMINE HYDROCHLORIDE 20 MG: 10 CAPSULE ORAL at 06:06

## 2024-06-03 RX ADMIN — ONDANSETRON 4 MG: 2 INJECTION INTRAMUSCULAR; INTRAVENOUS at 06:06

## 2024-06-03 RX ADMIN — ALUMINUM HYDROXIDE, MAGNESIUM HYDROXIDE, AND SIMETHICONE 30 ML: 1200; 120; 1200 SUSPENSION ORAL at 06:06

## 2024-06-03 RX ADMIN — SODIUM CHLORIDE 1000 ML: 9 INJECTION, SOLUTION INTRAVENOUS at 06:06

## 2024-06-03 NOTE — ED PROVIDER NOTES
"Encounter Date: 6/3/2024       History     Chief Complaint   Patient presents with    Abdominal Pain     Patient presents to ED with abdominal pain x 3 days; states she does not have BMs regularly  saw her PCP earlier he gave her a " pill " she went a few times but the discomfort and " bloating feeling has remained"      HPI  49 y.o.   Co LLQ pain x 3 days, some RLQ pain, come constipation, took laxative today and had BM but pain remains  Nausea but no vomiting  No bleeding  No exac/remitting factors  Mod  Nr    Review of patient's allergies indicates:   Allergen Reactions    Latex, natural rubber Itching     Past Medical History:   Diagnosis Date    GERD (gastroesophageal reflux disease)     History of abdominal pain     Renal cyst     per MD no ALEVE, IBUPROFEN, ADVIL    Urolithiasis      Past Surgical History:   Procedure Laterality Date    endometriosis      HYSTERECTOMY       Family History   Problem Relation Name Age of Onset    Asthma Mother      Hypertension Father      Asthma Daughter      Asthma Son      Hypertension Maternal Aunt      Stroke Maternal Grandfather       Social History     Tobacco Use    Smoking status: Never    Smokeless tobacco: Never   Substance Use Topics    Alcohol use: No    Drug use: No     Review of Systems  All systems were reviewed/examined and were negative except as noted in the HPI.    Physical Exam     Initial Vitals [06/03/24 1808]   BP Pulse Resp Temp SpO2   (!) 154/72 104 16 98.4 °F (36.9 °C) 98 %      MAP       --         Physical Exam    General: the patient is awake, alert, and in no apparent distress.  Head: normocephalic and atraumatic, sclera are clear  Neck: supple without meningismus  Chest: clear to auscultation bilaterally, no respiratory distress  Heart: regular rate and rhythm  ABD soft, mild LLQ t, nondistended, no peritoneal signs  Back nt in the midline  Extremities: warm and well perfused  Skin: warm and dry  Psych conversant  Neuro: awake, alert, moving all " extremities    ED Course   Procedures  Labs Reviewed   CBC W/ AUTO DIFFERENTIAL - Abnormal; Notable for the following components:       Result Value    MCV 80 (*)     All other components within normal limits   COMPREHENSIVE METABOLIC PANEL - Abnormal; Notable for the following components:    Total Protein 8.8 (*)     All other components within normal limits   LIPASE   URINALYSIS, REFLEX TO URINE CULTURE    Narrative:     Preferred Collection Type->Urine, Clean Catch  Specimen Source->Urine          Imaging Results              CT Abdomen Pelvis  Without Contrast (Final result)  Result time 06/03/24 19:10:44      Final result by Surendra Velez MD (06/03/24 19:10:44)                   Impression:      Punctate nonobstructing left renal calculi.  No hydronephrosis.    All CT scans   are performed using dose optimization techniques including the following: automated exposure control; adjustment of the mA and/or kV; use of iterative reconstruction technique.  Dose modulation was employed for ALARA by means of: Automated exposure control; adjustment of the mA and/or kV according to patient size (this includes techniques or standardized protocols for targeted exams where dose is matched to indication/reason for exam; i.e. extremities or head); and/or use of iterative reconstructive technique.      Electronically signed by: Surendra Velez  Date:    06/03/2024  Time:    19:10               Narrative:    EXAMINATION:  CT ABDOMEN PELVIS WITHOUT CONTRAST    CLINICAL HISTORY:  Abdominal pain, acute, nonlocalized;    TECHNIQUE:  Low dose axial images, sagittal and coronal reformations were obtained from the lung bases to the pubic symphysis.    COMPARISON:  None    FINDINGS:  Heart: Normal in size as far as seen.  No pericardial effusion as far seen.    Lung Bases: Well aerated, without consolidation or pleural fluid.    Liver: Normal in size and attenuation.  Subcentimeter hypodense lesion of the liver too small to further  characterize.    Gallbladder: No calcified gallstones.    Bile Ducts: No evidence of dilated ducts.    Pancreas: No mass or peripancreatic fat stranding.    Spleen: Unremarkable.    Adrenals: Unremarkable.    Kidneys/ Ureters: Punctate nonobstructing left renal calculi.  No hydronephrosis    Bladder: No evidence of wall thickening.    Reproductive organs: Status post hysterectomy    GI Tract/Mesentery: No evidence of bowel obstruction or inflammation. No secondary signs of appendicitis.    Peritoneal Space: No ascites. No free air.    Lymph nodes: No significant adenopathy.    Abdominal wall: Small fat containing umbilical hernia.    Vasculature: No aneurysm.  Atherosclerotic changes.    Bones: No acute fracture.                                       Medications   sodium chloride 0.9% bolus 1,000 mL 1,000 mL (0 mLs Intravenous Stopped 6/3/24 1937)   ondansetron injection 4 mg (4 mg Intravenous Given 6/3/24 1851)   dicyclomine capsule 20 mg (20 mg Oral Given 6/3/24 1853)   aluminum-magnesium hydroxide-simethicone 200-200-20 mg/5 mL suspension 30 mL (30 mLs Oral Given 6/3/24 1851)     Medical Decision Making  Amount and/or Complexity of Data Reviewed  Labs: ordered.  Radiology: ordered.    Risk  OTC drugs.  Prescription drug management.       Medical Decision Making:    This is an emergent evaluation of a patient presenting to the ED.  Nursing notes were reviewed.  Ct abd nad  I reviewed radiology images personally along with interpretations.  I personally reviewed and interpreted the laboratory results.  Cbc cmp lipase non critical    I decided to obtain and review old medical records, which showed: ortho and ED fu    Evaluation for Emergency Medical Condition  The patient received a medical screening exam and within a reasonable degree of clinical confidence an emergency medical condition has not been identified.  The patient is instructed on proper follow up and return precautions to the ED.    Ref gastro    Valentin  MD Marcelino, NGOC                                 Clinical Impression:  Final diagnoses:  [R10.30] Lower abdominal pain (Primary)          ED Disposition Condition    Discharge Stable          ED Prescriptions       Medication Sig Dispense Start Date End Date Auth. Provider    dicyclomine (BENTYL) 20 mg tablet Take 1 tablet (20 mg total) by mouth 2 (two) times daily. 20 tablet 6/3/2024 7/3/2024 Ricardo Benson MD    pantoprazole (PROTONIX) 20 MG tablet Take 1 tablet (20 mg total) by mouth once daily. 30 tablet 6/3/2024 6/3/2025 Ricardo Benson MD          Follow-up Information       Follow up With Specialties Details Why Contact Info Additional Information    Joy Matias MD Family Medicine Schedule an appointment as soon as possible for a visit   [address]  [address]  [place] [facility] [address] [phone]       [facility] Gastroenterology Schedule an appointment as soon as possible for a visit   [address]  [address]  [facility] [address] [phone] Please park in surface lot and check in at [address]          Discharged to home in stable condition, return to ED warnings given, follow up and patient care instructions given.      Valentin Benson MD, NGOC, FACEP  Department of Emergency Medicine       Ricardo Benson MD  06/04/24 0514

## 2024-06-03 NOTE — ED NOTES
Pt reports to ED with c/o all over ABD pain with bloating for 3 days now, pt states that she has un regular bowel movements, saw PCP earlier today and was given medication to help have bowel movement.          Adult Physical Assessment  LOC: 49 y.o. female verified via two identifiers.  The patient is awake, alert & oriented to person, place & time. No acute distress noted at this time, pt is speaking appropriately at this time.  APPEARANCE: Patient resting comfortably and appears to be in no acute distress at this time. Patient is clean and well groomed, patient's clothing is properly fastened.  SKIN:The skin is warm, dry & intact. Color consistent with ethnicity, patient has normal skin turgor and moist mucus membranes, skin intact, no breakdown or brusing noted.  MUSCULOSKELETAL: Patient moving all extremities well, no obvious swelling or deformities noted.  RESPIRATORY: Airway is open and patent, respirations are spontaneous, even, and non-labored patient has a normal effort and rate, no accessory muscle use noted.     CARDIAC: Patient has a normal rate and rhythm, no periphreal edema noted in any extremity, capillary refill < 3 seconds in all extremities  ABDOMEN: Soft and non tender to palpation, no abdominal distention noted.   NEUROLOGIC: Sensation is intact. Eyes open spontaneously, behavior appropriate to situation, follows commands. Speech is clear and appropriate. Facial expression symmetrical, bilateral hand grasp equal and even. No bilateral lower extremity edema.
